# Patient Record
Sex: FEMALE | Race: WHITE | NOT HISPANIC OR LATINO | Employment: OTHER | URBAN - METROPOLITAN AREA
[De-identification: names, ages, dates, MRNs, and addresses within clinical notes are randomized per-mention and may not be internally consistent; named-entity substitution may affect disease eponyms.]

---

## 2022-05-18 DIAGNOSIS — Z85.3 HX OF BREAST CANCER: Primary | ICD-10-CM

## 2022-05-24 DIAGNOSIS — Z85.3 HX OF BREAST CANCER: Primary | ICD-10-CM

## 2022-06-24 ENCOUNTER — LAB VISIT (OUTPATIENT)
Dept: LAB | Facility: HOSPITAL | Age: 67
End: 2022-06-24
Attending: STUDENT IN AN ORGANIZED HEALTH CARE EDUCATION/TRAINING PROGRAM
Payer: COMMERCIAL

## 2022-06-24 ENCOUNTER — OFFICE VISIT (OUTPATIENT)
Dept: RHEUMATOLOGY | Facility: CLINIC | Age: 67
End: 2022-06-24
Payer: COMMERCIAL

## 2022-06-24 VITALS
SYSTOLIC BLOOD PRESSURE: 120 MMHG | OXYGEN SATURATION: 91 % | TEMPERATURE: 99 F | HEART RATE: 78 BPM | BODY MASS INDEX: 25.88 KG/M2 | HEIGHT: 60 IN | DIASTOLIC BLOOD PRESSURE: 78 MMHG | RESPIRATION RATE: 18 BRPM | WEIGHT: 131.81 LBS

## 2022-06-24 DIAGNOSIS — F51.01 PRIMARY INSOMNIA: ICD-10-CM

## 2022-06-24 DIAGNOSIS — M05.9 SEROPOSITIVE RHEUMATOID ARTHRITIS: ICD-10-CM

## 2022-06-24 DIAGNOSIS — M79.7 FIBROMYALGIA SYNDROME: ICD-10-CM

## 2022-06-24 DIAGNOSIS — M05.9 SEROPOSITIVE RHEUMATOID ARTHRITIS: Primary | ICD-10-CM

## 2022-06-24 LAB
ALBUMIN SERPL-MCNC: 4.2 GM/DL (ref 3.4–4.8)
ALBUMIN/GLOB SERPL: 1.5 RATIO (ref 1.1–2)
ALP SERPL-CCNC: 87 UNIT/L (ref 40–150)
ALT SERPL-CCNC: 18 UNIT/L (ref 0–55)
AST SERPL-CCNC: 19 UNIT/L (ref 5–34)
BASOPHILS # BLD AUTO: 0.04 X10(3)/MCL (ref 0–0.2)
BASOPHILS NFR BLD AUTO: 0.4 %
BILIRUBIN DIRECT+TOT PNL SERPL-MCNC: 0.8 MG/DL
BUN SERPL-MCNC: 22.5 MG/DL (ref 9.8–20.1)
CALCIUM SERPL-MCNC: 9.8 MG/DL (ref 8.4–10.2)
CHLORIDE SERPL-SCNC: 105 MMOL/L (ref 98–107)
CO2 SERPL-SCNC: 25 MMOL/L (ref 23–31)
CREAT SERPL-MCNC: 0.61 MG/DL (ref 0.55–1.02)
CRP SERPL-MCNC: 14.3 MG/L
EOSINOPHIL # BLD AUTO: 0.19 X10(3)/MCL (ref 0–0.9)
EOSINOPHIL NFR BLD AUTO: 2.1 %
ERYTHROCYTE [DISTWIDTH] IN BLOOD BY AUTOMATED COUNT: 13.5 % (ref 11.5–17)
GLOBULIN SER-MCNC: 2.8 GM/DL (ref 2.4–3.5)
GLUCOSE SERPL-MCNC: 103 MG/DL (ref 82–115)
HCT VFR BLD AUTO: 44.3 % (ref 37–47)
HGB BLD-MCNC: 14.6 GM/DL (ref 12–16)
IMM GRANULOCYTES # BLD AUTO: 0.09 X10(3)/MCL (ref 0–0.02)
IMM GRANULOCYTES NFR BLD AUTO: 1 % (ref 0–0.43)
LYMPHOCYTES # BLD AUTO: 1.05 X10(3)/MCL (ref 0.6–4.6)
LYMPHOCYTES NFR BLD AUTO: 11.6 %
MCH RBC QN AUTO: 32.5 PG (ref 27–31)
MCHC RBC AUTO-ENTMCNC: 33 MG/DL (ref 33–36)
MCV RBC AUTO: 98.7 FL (ref 80–94)
MONOCYTES # BLD AUTO: 0.61 X10(3)/MCL (ref 0.1–1.3)
MONOCYTES NFR BLD AUTO: 6.7 %
NEUTROPHILS # BLD AUTO: 7.1 X10(3)/MCL (ref 2.1–9.2)
NEUTROPHILS NFR BLD AUTO: 78.2 %
NRBC BLD AUTO-RTO: 0 %
PLATELET # BLD AUTO: 270 X10(3)/MCL (ref 130–400)
PMV BLD AUTO: 10.5 FL (ref 9.4–12.4)
POTASSIUM SERPL-SCNC: 4.1 MMOL/L (ref 3.5–5.1)
PROT SERPL-MCNC: 7 GM/DL (ref 5.8–7.6)
RBC # BLD AUTO: 4.49 X10(6)/MCL (ref 4.2–5.4)
RHEUMATOID FACT SERPL-ACNC: <13 IU/ML
SODIUM SERPL-SCNC: 138 MMOL/L (ref 136–145)
WBC # SPEC AUTO: 9.1 X10(3)/MCL (ref 4.5–11.5)

## 2022-06-24 PROCEDURE — 85025 COMPLETE CBC W/AUTO DIFF WBC: CPT

## 2022-06-24 PROCEDURE — 86039 ANTINUCLEAR ANTIBODIES (ANA): CPT | Performed by: INTERNAL MEDICINE

## 2022-06-24 PROCEDURE — 1160F RVW MEDS BY RX/DR IN RCRD: CPT | Mod: CPTII,S$GLB,, | Performed by: INTERNAL MEDICINE

## 2022-06-24 PROCEDURE — 99999 PR PBB SHADOW E&M-EST. PATIENT-LVL IV: ICD-10-PCS | Mod: PBBFAC,,, | Performed by: INTERNAL MEDICINE

## 2022-06-24 PROCEDURE — 1159F MED LIST DOCD IN RCRD: CPT | Mod: CPTII,S$GLB,, | Performed by: INTERNAL MEDICINE

## 2022-06-24 PROCEDURE — 99999 PR PBB SHADOW E&M-EST. PATIENT-LVL IV: CPT | Mod: PBBFAC,,, | Performed by: INTERNAL MEDICINE

## 2022-06-24 PROCEDURE — 86431 RHEUMATOID FACTOR QUANT: CPT

## 2022-06-24 PROCEDURE — 80053 COMPREHEN METABOLIC PANEL: CPT

## 2022-06-24 PROCEDURE — 99204 OFFICE O/P NEW MOD 45 MIN: CPT | Mod: S$GLB,,, | Performed by: INTERNAL MEDICINE

## 2022-06-24 PROCEDURE — 36415 COLL VENOUS BLD VENIPUNCTURE: CPT

## 2022-06-24 PROCEDURE — 86803 HEPATITIS C AB TEST: CPT

## 2022-06-24 PROCEDURE — 87340 HEPATITIS B SURFACE AG IA: CPT

## 2022-06-24 PROCEDURE — 99204 PR OFFICE/OUTPT VISIT, NEW, LEVL IV, 45-59 MIN: ICD-10-PCS | Mod: S$GLB,,, | Performed by: INTERNAL MEDICINE

## 2022-06-24 PROCEDURE — 86480 TB TEST CELL IMMUN MEASURE: CPT | Performed by: INTERNAL MEDICINE

## 2022-06-24 PROCEDURE — 3008F PR BODY MASS INDEX (BMI) DOCUMENTED: ICD-10-PCS | Mod: CPTII,S$GLB,, | Performed by: INTERNAL MEDICINE

## 2022-06-24 PROCEDURE — 1159F PR MEDICATION LIST DOCUMENTED IN MEDICAL RECORD: ICD-10-PCS | Mod: CPTII,S$GLB,, | Performed by: INTERNAL MEDICINE

## 2022-06-24 PROCEDURE — 86140 C-REACTIVE PROTEIN: CPT

## 2022-06-24 PROCEDURE — 86200 CCP ANTIBODY: CPT

## 2022-06-24 PROCEDURE — 1160F PR REVIEW ALL MEDS BY PRESCRIBER/CLIN PHARMACIST DOCUMENTED: ICD-10-PCS | Mod: CPTII,S$GLB,, | Performed by: INTERNAL MEDICINE

## 2022-06-24 PROCEDURE — 3008F BODY MASS INDEX DOCD: CPT | Mod: CPTII,S$GLB,, | Performed by: INTERNAL MEDICINE

## 2022-06-24 RX ORDER — TRAMADOL HYDROCHLORIDE 50 MG/1
580 TABLET ORAL
COMMUNITY
End: 2023-10-23 | Stop reason: SDUPTHER

## 2022-06-24 RX ORDER — MELOXICAM 7.5 MG/1
7.5 TABLET ORAL DAILY
COMMUNITY
End: 2022-06-24 | Stop reason: SDUPTHER

## 2022-06-24 RX ORDER — TIZANIDINE 4 MG/1
4 TABLET ORAL NIGHTLY
Qty: 90 TABLET | Refills: 3 | Status: SHIPPED | OUTPATIENT
Start: 2022-06-24 | End: 2022-10-18 | Stop reason: SDUPTHER

## 2022-06-24 RX ORDER — METHOTREXATE 2.5 MG/1
2.5 TABLET ORAL
COMMUNITY
Start: 2022-05-07 | End: 2022-06-24

## 2022-06-24 RX ORDER — LEFLUNOMIDE 20 MG/1
20 TABLET ORAL
Qty: 90 TABLET | Refills: 3 | Status: SHIPPED | OUTPATIENT
Start: 2022-06-24 | End: 2022-07-05

## 2022-06-24 RX ORDER — DICLOFENAC SODIUM 30 MG/G
GEL TOPICAL
COMMUNITY
Start: 2022-06-15 | End: 2023-06-19

## 2022-06-24 RX ORDER — DEXTROMETHORPHAN HYDROBROMIDE, GUAIFENESIN 5; 100 MG/5ML; MG/5ML
650 LIQUID ORAL DAILY
COMMUNITY

## 2022-06-24 RX ORDER — LEVOTHYROXINE SODIUM 100 UG/1
100 TABLET ORAL DAILY
COMMUNITY
Start: 2022-05-13 | End: 2023-06-19

## 2022-06-24 RX ORDER — ATORVASTATIN CALCIUM 40 MG/1
40 TABLET, FILM COATED ORAL DAILY
COMMUNITY
Start: 2022-05-13

## 2022-06-24 RX ORDER — CYCLOBENZAPRINE HCL 10 MG
10 TABLET ORAL
COMMUNITY
End: 2022-06-24

## 2022-06-24 RX ORDER — MELOXICAM 7.5 MG/1
7.5 TABLET ORAL DAILY
Qty: 90 TABLET | Refills: 3 | Status: SHIPPED | OUTPATIENT
Start: 2022-06-24 | End: 2022-10-18 | Stop reason: SDUPTHER

## 2022-06-24 RX ORDER — LIOTHYRONINE SODIUM 5 UG/1
5 TABLET ORAL DAILY
COMMUNITY
Start: 2022-06-21

## 2022-06-24 RX ORDER — OMEPRAZOLE 40 MG/1
40 CAPSULE, DELAYED RELEASE ORAL EVERY MORNING
Qty: 90 CAPSULE | Refills: 3 | Status: SHIPPED | OUTPATIENT
Start: 2022-06-24 | End: 2022-10-18 | Stop reason: SDUPTHER

## 2022-06-24 RX ORDER — FOLIC ACID 1 MG/1
1 TABLET ORAL DAILY
COMMUNITY
End: 2022-06-24

## 2022-06-24 NOTE — PROGRESS NOTES
Subjective:       Patient ID: Hemant Eid is a 66 y.o. female.    Chief Complaint: New Patient  (Joint pain )    Pt  is complaining of joint pain involving his MCP PIP wrist elbow shoulders hips knees and ankles bilaterally.  Is 10/10 in intensity dull in quality and continuous.  It is associated with a morning stiffness lasting for more than 60 minutes. Pt reports having difficulty maintaining a good night of sleep and this has been associated with myalgia of 10/10 in intensity.  This pain is dull continuous and gets worse mainly at night.  It is associated with fatigue.  No fever no chills no others.      Review of Systems   Constitutional: Negative for appetite change, chills and fever.   HENT: Negative for congestion, ear pain, mouth sores, nosebleeds and trouble swallowing.    Eyes: Negative for photophobia and discharge.   Respiratory: Negative for chest tightness and shortness of breath.    Cardiovascular: Negative for chest pain.   Gastrointestinal: Negative for abdominal pain and vomiting.   Endocrine: Negative.    Genitourinary: Negative for hematuria.   Musculoskeletal:        As per HPI   Skin: Negative for rash.   Neurological: Negative for weakness.         Objective:   /78 (BP Location: Right arm, Patient Position: Sitting, BP Method: Medium (Automatic))   Pulse 78   Temp 99.1 °F (37.3 °C) (Oral)   Resp 18   Ht 5' (1.524 m)   Wt 59.8 kg (131 lb 12.8 oz)   SpO2 (!) 91%   BMI 25.74 kg/m²      Physical Exam   Constitutional: She is oriented to person, place, and time. She appears well-developed and well-nourished. No distress.   HENT:   Head: Normocephalic and atraumatic.   Right Ear: External ear normal.   Left Ear: External ear normal.   Eyes: Pupils are equal, round, and reactive to light.   Cardiovascular: Normal rate, regular rhythm and normal heart sounds.   Pulmonary/Chest: Breath sounds normal.   Abdominal: Soft. There is no abdominal tenderness.   Musculoskeletal:      Right  shoulder: Tenderness present.      Left shoulder: Tenderness present.      Right elbow: Tenderness present.      Left elbow: Tenderness present.      Right wrist: Tenderness present.      Left wrist: Tenderness present.      Cervical back: Neck supple.      Right hip: Tenderness present.      Left hip: Tenderness present.      Right knee: Tenderness present.      Left knee: Tenderness present.      Right ankle: Tenderness present.      Left ankle: Tenderness present.   Lymphadenopathy:     She has no cervical adenopathy.   Neurological: She is alert and oriented to person, place, and time. She displays normal reflexes. No cranial nerve deficit or sensory deficit. She exhibits normal muscle tone. Coordination normal.   Skin: No rash noted. No erythema.   Vitals reviewed.      Right Side Rheumatological Exam     The patient is tender to palpation of the shoulder, elbow, wrist, knee, 1st PIP, 1st MCP, 2nd PIP, 2nd MCP, 3rd PIP, 3rd MCP, 4th PIP, 4th MCP, 5th PIP, hip, ankle, 1st MTP, 2nd MTP, 3rd MTP, 4th MTP, 5th MTP, 1st toe IP, 2nd toe IP, 3rd toe IP, 4th toe IP and 5th toe IP    Left Side Rheumatological Exam     The patient is tender to palpation of the shoulder, elbow, wrist, knee, 1st PIP, 1st MCP, 2nd PIP, 2nd MCP, 3rd PIP, 3rd MCP, 4th PIP, 4th MCP, 5th PIP, 5th MCP, hip, ankle, 1st MTP, 2nd MTP, 3rd MTP, 4th MTP, 5th MTP, 1st toe IP, 2nd toe IP, 3rd toe IP, 4th toe IP and 5th toe IP.         Completed Fibromyalgia exam 18/18 tender points.  No data to display     Assessment:       1. Seropositive rheumatoid arthritis    2. Primary insomnia    3. Fibromyalgia syndrome            Plan:       Problem List Items Addressed This Visit    None     Visit Diagnoses     Seropositive rheumatoid arthritis    -  Primary    Relevant Medications    meloxicam (MOBIC) 7.5 MG tablet    omeprazole (PRILOSEC) 40 MG capsule    leflunomide (ARAVA) 20 MG Tab    tiZANidine (ZANAFLEX) 4 MG tablet    Other Relevant Orders     Quantiferon Gold TB    CBC Auto Differential    Comprehensive Metabolic Panel    C-Reactive Protein    Cyclic Citrullinated Peptide Antibody, IgG    Antinuclear Ab, HEp-2 Substrate    Rheumatoid Quantitative    Hepatitis B surface antigen    Hepatitis C antibody    Primary insomnia        Relevant Medications    meloxicam (MOBIC) 7.5 MG tablet    omeprazole (PRILOSEC) 40 MG capsule    leflunomide (ARAVA) 20 MG Tab    tiZANidine (ZANAFLEX) 4 MG tablet    Other Relevant Orders    Quantiferon Gold TB    CBC Auto Differential    Comprehensive Metabolic Panel    C-Reactive Protein    Cyclic Citrullinated Peptide Antibody, IgG    Antinuclear Ab, HEp-2 Substrate    Rheumatoid Quantitative    Hepatitis B surface antigen    Hepatitis C antibody    Fibromyalgia syndrome        Relevant Medications    meloxicam (MOBIC) 7.5 MG tablet    omeprazole (PRILOSEC) 40 MG capsule    leflunomide (ARAVA) 20 MG Tab    tiZANidine (ZANAFLEX) 4 MG tablet    Other Relevant Orders    Quantiferon Gold TB    CBC Auto Differential    Comprehensive Metabolic Panel    C-Reactive Protein    Cyclic Citrullinated Peptide Antibody, IgG    Antinuclear Ab, HEp-2 Substrate    Rheumatoid Quantitative    Hepatitis B surface antigen    Hepatitis C antibody

## 2022-06-25 LAB
CCP IGG SERPL-ACNC: 2 UNITS
HBV SURFACE AG SERPL QL IA: NONREACTIVE
HCV AB SERPL QL IA: NONREACTIVE

## 2022-06-27 LAB — ANA SER QL HEP2 SUBST: NORMAL

## 2022-06-28 LAB
GAMMA INTERFERON BACKGROUND BLD IA-ACNC: 0 IU/ML
M TB IFN-G BLD-IMP: NEGATIVE
M TB IFN-G CD4+ BCKGRND COR BLD-ACNC: 0 IU/ML
M TB IFN-G CD4+CD8+ BCKGRND COR BLD-ACNC: 0 IU/ML
MITOGEN IGNF BCKGRD COR BLD-ACNC: 10 IU/ML

## 2022-06-28 NOTE — PROGRESS NOTES
[facility] - Breast [place] [facility]  Breast Surgical Oncology  New Patient Office Visit - H&P      Referring Provider: Larissa Carpenter  PCP: Adam Carpenter, RN   Care Team:  Prior Breast Surgeon: Dr. Katherine Rivera ([phone])  Prior Medical Oncologist: Dr. Urrutia [facility]  Radiation Oncologist: No care team member to display       Chief Complaint:   Chief Complaint   Patient presents with    Breast Cancer     New Patient, h/o right breast cancer, last mammogram 10/4/21        Subjective:     HPI:  Hemant Eid is a 66 y.o. female with a PMH of right breast cancer diagnosed at age 61 s/p chemotherapy, mastectomy, and radiation. Since her treatments, she has followed with an Oncologist and a Breast Surgeon in [place] for breast cancer surveillance and is looking to establish care locally since moving to [place], [place].     She was originally diagnosed at age 60 after abnormal findings were seen on her screening mammogram. Core needle biopsy 6/22/2016 of a mass in the 11:30 position confirmed breast cancer (Stage IIB invasive ductal carcinoma cT2, cN1, cM0 ER/ND negative, HER2 positive). Further evaluation with MRI revealed multifocal disease (1.1 cm mass and 2.3 cm nonmass focus) and a 1.6 cm lymph node consistent with metastatic disease. She underwent neoadjuvant chemotherapy (PTCH x's 6 cycles). She then had a right simple mastectomy and SLNB which revealed no residual invasive carcinoma (ypT0, N0). Patient reports that she had adjuvant radiation. She was able to have CIERRA flap reconstruction of the right breast performed in [place], [place], which was complicated by an acute bleed/hematoma causing drastic drop in H/H. She is a Yazidi and was unable to receive blood products but eventually recovered with erythropoietin.  She has had no breast issues since other than pain in the right breast which is still bothersome to her today.     She has had many evaluations for other  unrelated problems which all found no evidence of disease (including back MRI in , PET/CT in 2019, CT abdomen , US Head/Neck 2018, US left back and arm 2018, US R UE 2018,).    Imagin. Prior imaging reports performed at [facility] in [facility] were reviewed. Will need to request discs of imaging to aid in continued surveillance.    OB/GYN History:  Age at Menarche Onset: 15  Menopausal Status: postmenopausal, LMP: No LMP recorded. Patient is postmenopausal.  Hysterectomy/Oophorectomy: menopause, at age 56  Hormonal birth control (duration): none  Pregnancy History:   Age at first live birth: 29  Hormone Replacement Therapy: No, none    Other:  MG breast density: Category B  Prior thoracic RT: none  Genetic testing: none  Ashkenazi Christianity descent: No    Family History:  History reviewed. No pertinent family history.     Patient History:  Past Medical History:   Diagnosis Date    Cancer Breast cancer    Dyspareunia After menopause    Menopause About 15 years ago    Menstrual symptom or sign 15 years old    Pregnancy 3    Thyroid disease Hashimoto       Body mass index is 25.86 kg/m².     Past Surgical History:   Procedure Laterality Date    BREAST SURGERY  2016     SECTION      MASTECTOMY Right     TONSILLECTOMY      TUBAL LIGATION  97       Social History     Socioeconomic History    Marital status:    Tobacco Use    Smoking status: Never Smoker    Smokeless tobacco: Never Used   Substance and Sexual Activity    Alcohol use: Yes     Comment: 1 or 2 drinks a week    Drug use: Never    Sexual activity: Yes     Partners: Male     Birth control/protection: None         There is no immunization history on file for this patient.    Medications/Allergies:    Current Outpatient Medications:     acetaminophen (TYLENOL) 650 MG TbSR, Take 650 mg by mouth once daily., Disp: , Rfl:     atorvastatin (LIPITOR) 40 MG tablet, Take 40 mg by mouth once  daily. for 90 days, Disp: , Rfl:     diclofenac sodium (SOLARAZE) 3 % gel, APPLY 1 GRAM 3-4 TIMES DAILY FOR TREATMENT OF PAIN, Disp: , Rfl:     leflunomide (ARAVA) 20 MG Tab, Take 1 tablet (20 mg total) by mouth daily with dinner or evening meal., Disp: 90 tablet, Rfl: 3    liothyronine (CYTOMEL) 5 MCG Tab, Take 5 mcg by mouth once daily., Disp: , Rfl:     meloxicam (MOBIC) 7.5 MG tablet, Take 1 tablet (7.5 mg total) by mouth once daily., Disp: 90 tablet, Rfl: 3    omeprazole (PRILOSEC) 40 MG capsule, Take 1 capsule (40 mg total) by mouth every morning., Disp: 90 capsule, Rfl: 3    SYNTHROID 100 mcg tablet, Take 100 mcg by mouth once daily. for 90 days, Disp: , Rfl:     tiZANidine (ZANAFLEX) 4 MG tablet, Take 1 tablet (4 mg total) by mouth nightly., Disp: 90 tablet, Rfl: 3    traMADoL (ULTRAM) 50 mg tablet, Take 580 mg by mouth as needed., Disp: , Rfl:      Review of patient's allergies indicates:   Allergen Reactions    Sulfa (sulfonamide antibiotics) Hives, Itching and Rash     Other reaction(s): Facial Swelling       Review of Systems:  Pertinent items are noted in HPI.     Objective:     Vitals:  Vitals:    06/29/22 0933   BP: 127/77   Pulse: 76   Resp: 18   Temp: 97.7 °F (36.5 °C)       Physical Exam:  General: The patient is awake, alert and oriented times three. The patient is well nourished and in no acute distress.  Neck: There is no evidence of palpable cervical, supraclavicular or axillary adenopathy. The neck is supple. The thyroid is not enlarged.  Musculoskeletal: The patient has a normal range of motion of her bilateral upper extremities.  Chest: Examination of the chest wall fails to reveal any obvious abnormalities. Nonlabored breathing, symmetric expansion.  Breast:  Right: Examination of right reconstructed breast fails to reveal any dominant masses or areas of significant focal nodularity. There is tenderness to palpation of the axilla and axillary tail. The nipple is surgically absent.  There is no skin dimpling with movement of the pectoralis. There are no significant skin changes overlying the breast.   Left: Examination of the left breast fails to reveal any dominant masses or areas of significant focal nodularity. The nipple is everted without evidence of discharge. There is no skin dimpling with movement of the pectoralis. There are no significant skin changes overlying the breast.  Abdomen: The abdomen is soft, flat, nontender and nondistended.  Integumentary: no rashes or skin lesions present  Neurologic: cranial nerves intact, no signs of peripheral neurological deficit, motor/sensory function intact      Assessment and Plan:            Hemant was seen today for breast cancer.    Diagnoses and all orders for this visit:    Personal history of breast cancer  -     Ambulatory referral/consult to Breast Surgery    History of right mastectomy    Breast pain, right    Screening mammogram, encounter for          Plan:       Referral to medical oncology (Dr. Blanchard) for continued breast cancer surveillance and history of bleeding (possible Von Willebrand per prior oncology notes?).    US right breast limited to evaluate breast/axilla pain. DG MG if needed.    L SCR MG due October 2022.    RTC in 1 year for breast cancer surveillance.    Prior imaging reports performed at [facility] in [facility] were reviewed. Will need to request discs of imaging to aid in continued surveillance.          All of her questions were answered.     Katie Burgos PA-C            --------------------------------------------------------------------------------------------------------------  Total time on the date of the visit ranged from 60-74 mins (62239). Total time includes both face-to-face and non-face-to-face time personally spent by myself on the day of the visit.    Non-face-to-face time included:  _X_ preparing to see the patient such as reviewing the patient record  _X_ obtaining and  reviewing separately obtained history  _X_ independently interpreting results  _X_ documenting clinical information in electronic health record.    Face-to-face time included:  _X_ performing an appropriate history and examination  _X_ communicating results to the patient  _X_ counseling and educating the patient  __ ordering appropriate medications  _x_ ordering appropriate tests  _X_ ordering appropriate procedures (including follow-up)  _X_ answering any questions the patient had    Total Time spent on date of visit: 60 minutes

## 2022-06-29 ENCOUNTER — OFFICE VISIT (OUTPATIENT)
Dept: SURGERY | Facility: CLINIC | Age: 67
End: 2022-06-29
Payer: COMMERCIAL

## 2022-06-29 VITALS
RESPIRATION RATE: 18 BRPM | SYSTOLIC BLOOD PRESSURE: 127 MMHG | DIASTOLIC BLOOD PRESSURE: 77 MMHG | BODY MASS INDEX: 25.99 KG/M2 | HEIGHT: 60 IN | WEIGHT: 132.38 LBS | HEART RATE: 76 BPM | OXYGEN SATURATION: 100 % | TEMPERATURE: 98 F

## 2022-06-29 DIAGNOSIS — Z90.11 HISTORY OF RIGHT MASTECTOMY: ICD-10-CM

## 2022-06-29 DIAGNOSIS — N64.4 BREAST PAIN, RIGHT: ICD-10-CM

## 2022-06-29 DIAGNOSIS — Z85.3 PERSONAL HISTORY OF BREAST CANCER: Primary | ICD-10-CM

## 2022-06-29 DIAGNOSIS — Z12.31 SCREENING MAMMOGRAM, ENCOUNTER FOR: ICD-10-CM

## 2022-06-29 PROCEDURE — 3008F PR BODY MASS INDEX (BMI) DOCUMENTED: ICD-10-PCS | Mod: CPTII,S$GLB,, | Performed by: PHYSICIAN ASSISTANT

## 2022-06-29 PROCEDURE — 99205 PR OFFICE/OUTPT VISIT, NEW, LEVL V, 60-74 MIN: ICD-10-PCS | Mod: S$GLB,,, | Performed by: PHYSICIAN ASSISTANT

## 2022-06-29 PROCEDURE — 3008F BODY MASS INDEX DOCD: CPT | Mod: CPTII,S$GLB,, | Performed by: PHYSICIAN ASSISTANT

## 2022-06-29 PROCEDURE — 1160F RVW MEDS BY RX/DR IN RCRD: CPT | Mod: CPTII,S$GLB,, | Performed by: PHYSICIAN ASSISTANT

## 2022-06-29 PROCEDURE — 99205 OFFICE O/P NEW HI 60 MIN: CPT | Mod: S$GLB,,, | Performed by: PHYSICIAN ASSISTANT

## 2022-06-29 PROCEDURE — 99999 PR PBB SHADOW E&M-EST. PATIENT-LVL V: ICD-10-PCS | Mod: PBBFAC,,,

## 2022-06-29 PROCEDURE — 3078F PR MOST RECENT DIASTOLIC BLOOD PRESSURE < 80 MM HG: ICD-10-PCS | Mod: CPTII,S$GLB,, | Performed by: PHYSICIAN ASSISTANT

## 2022-06-29 PROCEDURE — 1160F PR REVIEW ALL MEDS BY PRESCRIBER/CLIN PHARMACIST DOCUMENTED: ICD-10-PCS | Mod: CPTII,S$GLB,, | Performed by: PHYSICIAN ASSISTANT

## 2022-06-29 PROCEDURE — 3074F PR MOST RECENT SYSTOLIC BLOOD PRESSURE < 130 MM HG: ICD-10-PCS | Mod: CPTII,S$GLB,, | Performed by: PHYSICIAN ASSISTANT

## 2022-06-29 PROCEDURE — 3078F DIAST BP <80 MM HG: CPT | Mod: CPTII,S$GLB,, | Performed by: PHYSICIAN ASSISTANT

## 2022-06-29 PROCEDURE — 1159F MED LIST DOCD IN RCRD: CPT | Mod: CPTII,S$GLB,, | Performed by: PHYSICIAN ASSISTANT

## 2022-06-29 PROCEDURE — 3074F SYST BP LT 130 MM HG: CPT | Mod: CPTII,S$GLB,, | Performed by: PHYSICIAN ASSISTANT

## 2022-06-29 PROCEDURE — 99999 PR PBB SHADOW E&M-EST. PATIENT-LVL V: CPT | Mod: PBBFAC,,,

## 2022-06-29 PROCEDURE — 1159F PR MEDICATION LIST DOCUMENTED IN MEDICAL RECORD: ICD-10-PCS | Mod: CPTII,S$GLB,, | Performed by: PHYSICIAN ASSISTANT

## 2022-07-05 ENCOUNTER — TELEPHONE (OUTPATIENT)
Dept: RHEUMATOLOGY | Facility: CLINIC | Age: 67
End: 2022-07-05
Payer: COMMERCIAL

## 2022-07-05 DIAGNOSIS — F40.298 NEEDLE PHOBIA: ICD-10-CM

## 2022-07-05 DIAGNOSIS — M05.9 SEROPOSITIVE RHEUMATOID ARTHRITIS: Primary | ICD-10-CM

## 2022-07-05 RX ORDER — FOLIC ACID 1 MG/1
1 TABLET ORAL DAILY
Qty: 30 TABLET | Refills: 5 | Status: SHIPPED | OUTPATIENT
Start: 2022-07-05 | End: 2022-07-27

## 2022-07-05 RX ORDER — METHOTREXATE 2.5 MG/1
10 TABLET ORAL
Qty: 20 TABLET | Refills: 5 | Status: SHIPPED | OUTPATIENT
Start: 2022-07-05 | End: 2022-07-27

## 2022-07-05 NOTE — TELEPHONE ENCOUNTER
Patient called stating that the leflunomide (Arava) Rx is causing her to get headaches when getting up in the morning.  She spoke to her PCP about it and stated that is a side affect.  She would like to know if there is anything else that can be prescribed?  Please advise.

## 2022-07-06 NOTE — TELEPHONE ENCOUNTER
Spoke with patient and she stated that she has been on methotrexate for years and feels like it doesn't help and that's why she was prescribed this new medication. The patient is also wanting you to look at her c reactive protein and how high it was before seeing you as a new patient. Is there anything that you would like to prescribe for this patient besides the methotrexate? Please Advise.. Thanks

## 2022-07-07 NOTE — TELEPHONE ENCOUNTER
Spoke with pt she stated that she has tried Plaquenil as well and would like to maybe try a newer drug. I did explain that some medications require trying lower tier drugs before being approved. Pt verbalized understanding. Is there anything newer you can write for her? Please advise. Thanks

## 2022-07-08 ENCOUNTER — PATIENT MESSAGE (OUTPATIENT)
Dept: RHEUMATOLOGY | Facility: CLINIC | Age: 67
End: 2022-07-08
Payer: COMMERCIAL

## 2022-07-11 ENCOUNTER — TELEPHONE (OUTPATIENT)
Dept: RHEUMATOLOGY | Facility: CLINIC | Age: 67
End: 2022-07-11
Payer: COMMERCIAL

## 2022-07-11 NOTE — TELEPHONE ENCOUNTER
Spoke with the patient she would like to try the Rinvoq before trying the injection.. I did speak with Toshia and  She said that she can work on the PA for this patient as soon as you send the script in . Please Advise.. Thanks

## 2022-07-12 RX ORDER — UPADACITINIB 15 MG/1
15 TABLET, EXTENDED RELEASE ORAL DAILY
Qty: 30 TABLET | Refills: 11 | Status: SHIPPED | OUTPATIENT
Start: 2022-07-12 | End: 2022-08-02 | Stop reason: SDUPTHER

## 2022-07-15 NOTE — TELEPHONE ENCOUNTER
Script has been faxed to [facility] specialty pharmacy       ----- Message from Meredith Washington sent at 7/15/2022  9:49 AM CDT -----  Regarding: Medication refill  [facility] called requesting Script for Rinvoq... Fax: [phone]   Phone: [phone]

## 2022-07-26 ENCOUNTER — DOCUMENTATION ONLY (OUTPATIENT)
Dept: HEMATOLOGY/ONCOLOGY | Facility: CLINIC | Age: 67
End: 2022-07-26
Payer: COMMERCIAL

## 2022-07-26 NOTE — PROGRESS NOTES
Subjective:       Patient ID: Hemant Eid is a 66 y.o. female.    Chief Complaint: Breast Cancer (New Oncology patient-- Has concerns about her RA medication)      Diagnosis:  1.  cT2, N1, M0 stage IIB ER/ND negative, HER2 positive multifocal invasive ductal carcinoma of the right breast diagnosed in 2016. Complete pathologic response to neoadjuvant therapy.    Current Treatment:   1.  Observation    Treatment History:  1. Neoadjuvant TCHP with Neulasta for 6 cycles.  2. Right mastectomy  3. Radiation therapy  4. Maintenance Herceptin for 13 additional cycles to complete 1 year     HPI  Patient who underwent routine imaging in [place] and was found to have an abnormality in the right breast, underwent a right core needle biopsy on 06/22/2016 revealed invasive ductal carcinoma, grade 2 with right axilla having lymph nodes positive for metastatic carcinoma.  ER 0%, ND 0%, HER2 3+ by IHC with a Ki-67 of 50%.  She then had a right breast MRI on 07/06/2016 that revealed right breast mass along with right axillary lymph node.  PET/CT scan on 07/08/2016 showed right lateral breast nodularity consistent with known cancer and mildly enlarged right axillary lymph node consistent with metastatic disease.  No distant metastatic disease present.  She then underwent a biopsy of a separate right breast lesion on 07/20/2016 that revealed grade 2-3 invasive ductal carcinoma, ER/ND negative, HER2 positive with a Ki-67 of 35%.  She was treated with neoadjuvant TCHP for 6 cycles, started on 07/26/2016.  She underwent a right mastectomy on 12/08/2016 that revealed complete pathologic response.  She then completed chest wall and axillary radiation.  She also had maintenance Herceptin that he finished in July of 2017. She underwent CIERRA flap reconstruction in [place], [place] on 12/07/2017.  The patient was undergoing surveillance, most recently had a mammogram in October 2021 showing no evidence of malignancy.  She relocated in  moved to [facility] and established care with Dr. Lima simpson in 2022. She wanted a medical oncologist, saw me on 2022.  She had no major complaints to discuss at that visit.     Interval History:   Initial consult.    Past Medical History:   Diagnosis Date    Cancer Breast cancer    Dyspareunia After menopause    Menopause About 15 years ago    Menstrual symptom or sign 15 years old    Pregnancy 3    Thyroid disease Hashimoto      Past Surgical History:   Procedure Laterality Date    BREAST SURGERY  2016     SECTION      MASTECTOMY Right     TONSILLECTOMY      TUBAL LIGATION  97     Social History     Socioeconomic History    Marital status:    Tobacco Use    Smoking status: Never Smoker    Smokeless tobacco: Never Used   Substance and Sexual Activity    Alcohol use: Yes     Comment: 1 or 2 drinks a week    Drug use: Never    Sexual activity: Yes     Partners: Male     Birth control/protection: Post-menopausal, None      Family History   Problem Relation Age of Onset    COPD Mother     Heart disease Maternal Grandmother     Arthritis Daughter     Learning disabilities Son       Review of patient's allergies indicates:   Allergen Reactions    Sulfa (sulfonamide antibiotics) Hives, Itching and Rash     Other reaction(s): Facial Swelling      Review of Systems   Constitutional: Negative for appetite change and unexpected weight change.   HENT: Negative for mouth sores.    Eyes: Negative for visual disturbance.   Respiratory: Negative for cough and shortness of breath.    Cardiovascular: Negative for chest pain.   Gastrointestinal: Negative for abdominal pain and diarrhea.   Genitourinary: Negative for frequency.   Musculoskeletal: Negative for back pain.   Integumentary:  Negative for rash.   Neurological: Negative for headaches.   Hematological: Negative for adenopathy.   Psychiatric/Behavioral: The patient is not nervous/anxious.          Objective:       Physical Exam  Vitals reviewed. Exam conducted with a chaperone present.   Constitutional:       General: She is awake.      Appearance: Normal appearance. She is well-developed.   HENT:      Head: Normocephalic and atraumatic.   Eyes:      General: Lids are normal. Vision grossly intact.      Extraocular Movements: Extraocular movements intact.      Conjunctiva/sclera: Conjunctivae normal.   Cardiovascular:      Rate and Rhythm: Normal rate and regular rhythm.      Pulses: Normal pulses.      Heart sounds: Normal heart sounds.   Pulmonary:      Effort: Pulmonary effort is normal.      Breath sounds: Normal breath sounds.   Chest:   Breasts:      Right: Normal. No axillary adenopathy or supraclavicular adenopathy.      Left: Normal. No axillary adenopathy or supraclavicular adenopathy.       Abdominal:      General: Bowel sounds are normal. There is no distension.      Palpations: Abdomen is soft.      Tenderness: There is no abdominal tenderness.   Musculoskeletal:      Cervical back: Full passive range of motion without pain.      Right lower leg: No edema.      Left lower leg: No edema.   Lymphadenopathy:      Cervical: No cervical adenopathy.      Upper Body:      Right upper body: No supraclavicular, axillary or pectoral adenopathy.      Left upper body: No supraclavicular, axillary or pectoral adenopathy.   Skin:     General: Skin is warm and dry.   Neurological:      General: No focal deficit present.      Mental Status: She is alert and oriented to person, place, and time.   Psychiatric:         Attention and Perception: Attention and perception normal.         Behavior: Behavior is cooperative.         LABS AND IMAGING REVIEWED IN EPIC          Assessment:   1.  cT2, N1, M0 stage IIB ER/HI negative, HER2 positive multifocal invasive ductal carcinoma of the right breast diagnosed in 2016. Complete pathologic response to neoadjuvant therapy.        Plan:       Patient had early stage breast cancer,  underwent neoadjuvant TCHP x6 cycles in 2016.  Had a complete pathologic response, mammogram was done in December of 2016.    She then completed radiation and maintenance Herceptin.    She has been on surveillance since that time.  She recently moved to [place].  She wanted to establish care here.    She is due for mammogram in October of this year, already ordered.    I will get blood work today including tumor markers.    She will return to clinic in 1 year with blood work and tumor markers.      Jesús Blanchard II, MD

## 2022-07-27 ENCOUNTER — OFFICE VISIT (OUTPATIENT)
Dept: HEMATOLOGY/ONCOLOGY | Facility: CLINIC | Age: 67
End: 2022-07-27
Payer: COMMERCIAL

## 2022-07-27 VITALS
BODY MASS INDEX: 25.32 KG/M2 | HEIGHT: 60 IN | HEART RATE: 77 BPM | OXYGEN SATURATION: 97 % | WEIGHT: 129 LBS | SYSTOLIC BLOOD PRESSURE: 128 MMHG | RESPIRATION RATE: 18 BRPM | DIASTOLIC BLOOD PRESSURE: 79 MMHG | TEMPERATURE: 98 F

## 2022-07-27 DIAGNOSIS — Z85.3 PERSONAL HISTORY OF BREAST CANCER: ICD-10-CM

## 2022-07-27 DIAGNOSIS — Z85.3 HX OF BREAST CANCER: ICD-10-CM

## 2022-07-27 DIAGNOSIS — Z90.11 HISTORY OF RIGHT MASTECTOMY: ICD-10-CM

## 2022-07-27 LAB
ALBUMIN SERPL-MCNC: 4.2 GM/DL (ref 3.4–4.8)
ALBUMIN/GLOB SERPL: 1.6 RATIO (ref 1.1–2)
ALP SERPL-CCNC: 88 UNIT/L (ref 40–150)
ALT SERPL-CCNC: 20 UNIT/L (ref 0–55)
AST SERPL-CCNC: 23 UNIT/L (ref 5–34)
BASOPHILS # BLD AUTO: 0.02 X10(3)/MCL (ref 0–0.2)
BASOPHILS NFR BLD AUTO: 0.4 %
BILIRUBIN DIRECT+TOT PNL SERPL-MCNC: 1.1 MG/DL
BUN SERPL-MCNC: 21 MG/DL (ref 9.8–20.1)
CALCIUM SERPL-MCNC: 9.2 MG/DL (ref 8.4–10.2)
CEA SERPL-MCNC: <1.73 NG/ML (ref 0–3)
CHLORIDE SERPL-SCNC: 105 MMOL/L (ref 98–107)
CO2 SERPL-SCNC: 25 MMOL/L (ref 23–31)
CREAT SERPL-MCNC: 0.71 MG/DL (ref 0.55–1.02)
EOSINOPHIL # BLD AUTO: 0.12 X10(3)/MCL (ref 0–0.9)
EOSINOPHIL NFR BLD AUTO: 2.3 %
ERYTHROCYTE [DISTWIDTH] IN BLOOD BY AUTOMATED COUNT: 12.4 % (ref 11.5–17)
GLOBULIN SER-MCNC: 2.7 GM/DL (ref 2.4–3.5)
GLUCOSE SERPL-MCNC: 97 MG/DL (ref 82–115)
HCT VFR BLD AUTO: 41.1 % (ref 37–47)
HGB BLD-MCNC: 13.6 GM/DL (ref 12–16)
IMM GRANULOCYTES # BLD AUTO: 0.01 X10(3)/MCL (ref 0–0.04)
IMM GRANULOCYTES NFR BLD AUTO: 0.2 %
LYMPHOCYTES # BLD AUTO: 1.57 X10(3)/MCL (ref 0.6–4.6)
LYMPHOCYTES NFR BLD AUTO: 30.7 %
MCH RBC QN AUTO: 32.1 PG (ref 27–31)
MCHC RBC AUTO-ENTMCNC: 33.1 MG/DL (ref 33–36)
MCV RBC AUTO: 96.9 FL (ref 80–94)
MONOCYTES # BLD AUTO: 0.42 X10(3)/MCL (ref 0.1–1.3)
MONOCYTES NFR BLD AUTO: 8.2 %
NEUTROPHILS # BLD AUTO: 3 X10(3)/MCL (ref 2.1–9.2)
NEUTROPHILS NFR BLD AUTO: 58.2 %
PLATELET # BLD AUTO: 234 X10(3)/MCL (ref 130–400)
PMV BLD AUTO: 10.8 FL (ref 7.4–10.4)
POTASSIUM SERPL-SCNC: 3.8 MMOL/L (ref 3.5–5.1)
PROT SERPL-MCNC: 6.9 GM/DL (ref 5.8–7.6)
RBC # BLD AUTO: 4.24 X10(6)/MCL (ref 4.2–5.4)
SODIUM SERPL-SCNC: 140 MMOL/L (ref 136–145)
WBC # SPEC AUTO: 5.1 X10(3)/MCL (ref 4.5–11.5)

## 2022-07-27 PROCEDURE — 1159F PR MEDICATION LIST DOCUMENTED IN MEDICAL RECORD: ICD-10-PCS | Mod: CPTII,S$GLB,, | Performed by: INTERNAL MEDICINE

## 2022-07-27 PROCEDURE — 82378 CARCINOEMBRYONIC ANTIGEN: CPT | Performed by: INTERNAL MEDICINE

## 2022-07-27 PROCEDURE — 99999 PR PBB SHADOW E&M-EST. PATIENT-LVL V: ICD-10-PCS | Mod: PBBFAC,,, | Performed by: INTERNAL MEDICINE

## 2022-07-27 PROCEDURE — 3078F PR MOST RECENT DIASTOLIC BLOOD PRESSURE < 80 MM HG: ICD-10-PCS | Mod: CPTII,S$GLB,, | Performed by: INTERNAL MEDICINE

## 2022-07-27 PROCEDURE — 36415 COLL VENOUS BLD VENIPUNCTURE: CPT | Performed by: INTERNAL MEDICINE

## 2022-07-27 PROCEDURE — 1160F PR REVIEW ALL MEDS BY PRESCRIBER/CLIN PHARMACIST DOCUMENTED: ICD-10-PCS | Mod: CPTII,S$GLB,, | Performed by: INTERNAL MEDICINE

## 2022-07-27 PROCEDURE — 3078F DIAST BP <80 MM HG: CPT | Mod: CPTII,S$GLB,, | Performed by: INTERNAL MEDICINE

## 2022-07-27 PROCEDURE — 99999 PR PBB SHADOW E&M-EST. PATIENT-LVL V: CPT | Mod: PBBFAC,,, | Performed by: INTERNAL MEDICINE

## 2022-07-27 PROCEDURE — 3008F BODY MASS INDEX DOCD: CPT | Mod: CPTII,S$GLB,, | Performed by: INTERNAL MEDICINE

## 2022-07-27 PROCEDURE — 99205 OFFICE O/P NEW HI 60 MIN: CPT | Mod: S$GLB,,, | Performed by: INTERNAL MEDICINE

## 2022-07-27 PROCEDURE — 1160F RVW MEDS BY RX/DR IN RCRD: CPT | Mod: CPTII,S$GLB,, | Performed by: INTERNAL MEDICINE

## 2022-07-27 PROCEDURE — 3074F SYST BP LT 130 MM HG: CPT | Mod: CPTII,S$GLB,, | Performed by: INTERNAL MEDICINE

## 2022-07-27 PROCEDURE — 85025 COMPLETE CBC W/AUTO DIFF WBC: CPT | Performed by: INTERNAL MEDICINE

## 2022-07-27 PROCEDURE — 80053 COMPREHEN METABOLIC PANEL: CPT | Performed by: INTERNAL MEDICINE

## 2022-07-27 PROCEDURE — 3074F PR MOST RECENT SYSTOLIC BLOOD PRESSURE < 130 MM HG: ICD-10-PCS | Mod: CPTII,S$GLB,, | Performed by: INTERNAL MEDICINE

## 2022-07-27 PROCEDURE — 3008F PR BODY MASS INDEX (BMI) DOCUMENTED: ICD-10-PCS | Mod: CPTII,S$GLB,, | Performed by: INTERNAL MEDICINE

## 2022-07-27 PROCEDURE — 99205 PR OFFICE/OUTPT VISIT, NEW, LEVL V, 60-74 MIN: ICD-10-PCS | Mod: S$GLB,,, | Performed by: INTERNAL MEDICINE

## 2022-07-27 PROCEDURE — 1159F MED LIST DOCD IN RCRD: CPT | Mod: CPTII,S$GLB,, | Performed by: INTERNAL MEDICINE

## 2022-07-28 LAB — CANCER AG15-3 SERPL IA-ACNC: 13 U/ML

## 2022-08-02 DIAGNOSIS — F40.298 NEEDLE PHOBIA: ICD-10-CM

## 2022-08-02 DIAGNOSIS — M05.9 SEROPOSITIVE RHEUMATOID ARTHRITIS: ICD-10-CM

## 2022-08-02 RX ORDER — UPADACITINIB 15 MG/1
15 TABLET, EXTENDED RELEASE ORAL DAILY
Qty: 30 TABLET | Refills: 11 | Status: SHIPPED | OUTPATIENT
Start: 2022-08-02 | End: 2023-03-06

## 2022-08-12 ENCOUNTER — TELEPHONE (OUTPATIENT)
Dept: RHEUMATOLOGY | Facility: CLINIC | Age: 67
End: 2022-08-12
Payer: COMMERCIAL

## 2022-08-12 NOTE — TELEPHONE ENCOUNTER
Pt states that they are going to be going on a cruise in [place] and is wanting to know if you can write for Paloma for her.   Please advise  Thanks.

## 2022-08-15 DIAGNOSIS — M05.9 SEROPOSITIVE RHEUMATOID ARTHRITIS: Primary | ICD-10-CM

## 2022-08-15 DIAGNOSIS — M79.7 FIBROMYALGIA SYNDROME: ICD-10-CM

## 2022-08-15 DIAGNOSIS — F51.01 PRIMARY INSOMNIA: ICD-10-CM

## 2022-08-16 ENCOUNTER — TELEPHONE (OUTPATIENT)
Dept: RHEUMATOLOGY | Facility: CLINIC | Age: 67
End: 2022-08-16
Payer: COMMERCIAL

## 2022-08-16 DIAGNOSIS — M05.9 SEROPOSITIVE RHEUMATOID ARTHRITIS: Primary | ICD-10-CM

## 2022-08-16 DIAGNOSIS — F51.01 PRIMARY INSOMNIA: ICD-10-CM

## 2022-08-16 DIAGNOSIS — M79.7 FIBROMYALGIA SYNDROME: ICD-10-CM

## 2022-08-16 RX ORDER — DARIDOREXANT 25 MG/1
25 TABLET, FILM COATED ORAL NIGHTLY
Qty: 30 TABLET | Refills: 5 | Status: SHIPPED | OUTPATIENT
Start: 2022-08-16 | End: 2022-10-18

## 2022-08-16 NOTE — TELEPHONE ENCOUNTER
Pt is wondering what your thoughts on on this new sleep medication Quviviq  Pt is having a lot of trouble sleeping and has been taking natural supplements to help her but has not had any positive outcome. Was on Ambien in the past but ended up having a reaction.   Pt is wanting to know if you can write this for this pt if she would benefit from this medication.    ----- Message from Meredith Washington sent at 8/16/2022 11:24 AM CDT -----  Regarding: injection  Patient called stating that she had spoken to a nurse on Friday concerning injection medication. Please call her back. She also has another question. [phone]

## 2022-08-16 NOTE — TELEPHONE ENCOUNTER
Spoke with patient and she is stating that she already tried the Lunesta in the past .. Please Advise.. thanks

## 2022-08-18 ENCOUNTER — TELEPHONE (OUTPATIENT)
Dept: RHEUMATOLOGY | Facility: CLINIC | Age: 67
End: 2022-08-18
Payer: COMMERCIAL

## 2022-08-18 ENCOUNTER — CLINICAL SUPPORT (OUTPATIENT)
Dept: FAMILY MEDICINE | Facility: CLINIC | Age: 67
End: 2022-08-18
Payer: COMMERCIAL

## 2022-08-18 DIAGNOSIS — M79.7 FIBROMYALGIA SYNDROME: ICD-10-CM

## 2022-08-18 DIAGNOSIS — M05.9 SEROPOSITIVE RHEUMATOID ARTHRITIS: ICD-10-CM

## 2022-08-18 DIAGNOSIS — F51.01 PRIMARY INSOMNIA: ICD-10-CM

## 2022-08-18 NOTE — TELEPHONE ENCOUNTER
----- Message from Toshia Mcintyre sent at 8/18/2022  8:33 AM CDT -----  Regarding: RE: Quviviq   I was able to get a coupon that brought the first fill down to $0. But after that it shows there will be a coupon limit but doesn't tell me how much. I did speak to the patient and had gotten a list of the things she tried. I don't mind working on an appeal if we can get some info about why this is the better choice for her. Just let me know.    ----- Message -----  From: Darvin Rosenthal MD  Sent: 8/17/2022   4:27 PM CDT  To: Toshia Mcintyre  Subject: RE: Quviviq                                      She tried ambien and lunesta and did not improve. I can try her on temazepam. Please Kathi or Bharti check with the patient if she is ok getting on temazepam since the other med is not covered. She may also check on good rx how much it would cost her. Thanks bh   ----- Message -----  From: Toshia Mcintyre  Sent: 8/17/2022   9:30 AM CDT  To: Darvin Rosenthal MD  Subject: Quviviq                                          Good morning, I was trying to complete the Prior Authorization for this patients Quviviq but this drug is not on her formulary. I called to speak to her insurance and the only way I can try for a coverage exception is if she has tried and failed or has a contraindication for all the following doxepin, estazolam, eszopiclone, flurazepam, quazepam, ramelteon, temazepam, triazolam, zaleplon, zolpidem/ER, BELSOMRA, DAYVIGO, RESTORIL, SILENOR, or ZOLPIMIST. Please let me know what you would like me to do. I did try to contact Hemant to talk with her about it but I had to leave a message.

## 2022-08-19 PROBLEM — M05.9 SEROPOSITIVE RHEUMATOID ARTHRITIS: Status: ACTIVE | Noted: 2022-08-19

## 2022-08-19 PROBLEM — M79.7 FIBROMYALGIA SYNDROME: Status: ACTIVE | Noted: 2022-08-19

## 2022-08-19 RX ORDER — EPINEPHRINE 0.3 MG/.3ML
0.3 INJECTION SUBCUTANEOUS
Status: CANCELLED | OUTPATIENT
Start: 2022-08-19

## 2022-08-19 RX ORDER — ALBUTEROL SULFATE 90 UG/1
2 AEROSOL, METERED RESPIRATORY (INHALATION)
Status: CANCELLED | OUTPATIENT
Start: 2022-08-19

## 2022-08-19 RX ORDER — ACETAMINOPHEN 325 MG/1
650 TABLET ORAL ONCE
Status: CANCELLED | OUTPATIENT
Start: 2022-08-19 | End: 2022-08-19

## 2022-08-19 RX ORDER — DIPHENHYDRAMINE HCL 25 MG
25 CAPSULE ORAL ONCE
Status: CANCELLED | OUTPATIENT
Start: 2022-08-19 | End: 2022-08-19

## 2022-08-19 RX ORDER — PREDNISONE 1 MG/1
40 TABLET ORAL ONCE
Status: CANCELLED | OUTPATIENT
Start: 2022-08-19 | End: 2022-08-19

## 2022-08-19 RX ORDER — ONDANSETRON 4 MG/1
4 TABLET, ORALLY DISINTEGRATING ORAL ONCE
Status: CANCELLED | OUTPATIENT
Start: 2022-08-19 | End: 2022-08-19

## 2022-08-22 NOTE — PROGRESS NOTES
Patient received two gluteal intramuscular injections (left and right) of tixagevimab(right)/cilgavimab(left) ([facility]) with no difficulties tolerating the medication  Given 8/19/22 @ 1:19 pm

## 2022-08-22 NOTE — PROGRESS NOTES
Attempting to complete order for Evusheld. Nursing and pharmacy having difficulty entering. Medication rejections states this visit requires a progress note. Entering [facility] and attempting to complete the requirements for the Evusheld therapy plan order.

## 2022-08-27 ENCOUNTER — PATIENT MESSAGE (OUTPATIENT)
Dept: RHEUMATOLOGY | Facility: CLINIC | Age: 67
End: 2022-08-27
Payer: COMMERCIAL

## 2022-08-27 DIAGNOSIS — M79.7 FIBROMYALGIA SYNDROME: ICD-10-CM

## 2022-08-27 DIAGNOSIS — M05.9 SEROPOSITIVE RHEUMATOID ARTHRITIS: ICD-10-CM

## 2022-08-27 DIAGNOSIS — F51.01 PRIMARY INSOMNIA: ICD-10-CM

## 2022-08-31 ENCOUNTER — PATIENT MESSAGE (OUTPATIENT)
Dept: RHEUMATOLOGY | Facility: CLINIC | Age: 67
End: 2022-08-31
Payer: COMMERCIAL

## 2022-08-31 NOTE — TELEPHONE ENCOUNTER
I would like his opinion on a different sleep medication not just more of the muscle relaxant. Something to get to sleep and stay asleep would be great, I have tried ambien and Lunesta.

## 2022-09-01 ENCOUNTER — PATIENT MESSAGE (OUTPATIENT)
Dept: RHEUMATOLOGY | Facility: CLINIC | Age: 67
End: 2022-09-01
Payer: COMMERCIAL

## 2022-09-01 RX ORDER — CLONAZEPAM 0.5 MG/1
0.5 TABLET ORAL NIGHTLY
Qty: 30 TABLET | Refills: 5 | Status: SHIPPED | OUTPATIENT
Start: 2022-09-01 | End: 2022-10-18

## 2022-10-18 ENCOUNTER — OFFICE VISIT (OUTPATIENT)
Dept: RHEUMATOLOGY | Facility: CLINIC | Age: 67
End: 2022-10-18
Payer: COMMERCIAL

## 2022-10-18 VITALS
SYSTOLIC BLOOD PRESSURE: 144 MMHG | DIASTOLIC BLOOD PRESSURE: 72 MMHG | OXYGEN SATURATION: 97 % | HEIGHT: 60 IN | HEART RATE: 82 BPM | BODY MASS INDEX: 25.36 KG/M2 | RESPIRATION RATE: 20 BRPM | WEIGHT: 129.19 LBS | TEMPERATURE: 98 F

## 2022-10-18 DIAGNOSIS — M05.9 SEROPOSITIVE RHEUMATOID ARTHRITIS: Primary | ICD-10-CM

## 2022-10-18 DIAGNOSIS — M79.7 FIBROMYALGIA SYNDROME: ICD-10-CM

## 2022-10-18 DIAGNOSIS — F51.01 PRIMARY INSOMNIA: ICD-10-CM

## 2022-10-18 PROCEDURE — 3077F SYST BP >= 140 MM HG: CPT | Mod: CPTII,S$GLB,, | Performed by: INTERNAL MEDICINE

## 2022-10-18 PROCEDURE — 1159F PR MEDICATION LIST DOCUMENTED IN MEDICAL RECORD: ICD-10-PCS | Mod: CPTII,S$GLB,, | Performed by: INTERNAL MEDICINE

## 2022-10-18 PROCEDURE — 99214 OFFICE O/P EST MOD 30 MIN: CPT | Mod: S$GLB,,, | Performed by: INTERNAL MEDICINE

## 2022-10-18 PROCEDURE — 1159F MED LIST DOCD IN RCRD: CPT | Mod: CPTII,S$GLB,, | Performed by: INTERNAL MEDICINE

## 2022-10-18 PROCEDURE — 3077F PR MOST RECENT SYSTOLIC BLOOD PRESSURE >= 140 MM HG: ICD-10-PCS | Mod: CPTII,S$GLB,, | Performed by: INTERNAL MEDICINE

## 2022-10-18 PROCEDURE — 99999 PR PBB SHADOW E&M-EST. PATIENT-LVL IV: ICD-10-PCS | Mod: PBBFAC,,, | Performed by: INTERNAL MEDICINE

## 2022-10-18 PROCEDURE — 3078F PR MOST RECENT DIASTOLIC BLOOD PRESSURE < 80 MM HG: ICD-10-PCS | Mod: CPTII,S$GLB,, | Performed by: INTERNAL MEDICINE

## 2022-10-18 PROCEDURE — 99999 PR PBB SHADOW E&M-EST. PATIENT-LVL IV: CPT | Mod: PBBFAC,,, | Performed by: INTERNAL MEDICINE

## 2022-10-18 PROCEDURE — 99214 PR OFFICE/OUTPT VISIT, EST, LEVL IV, 30-39 MIN: ICD-10-PCS | Mod: S$GLB,,, | Performed by: INTERNAL MEDICINE

## 2022-10-18 PROCEDURE — 3078F DIAST BP <80 MM HG: CPT | Mod: CPTII,S$GLB,, | Performed by: INTERNAL MEDICINE

## 2022-10-18 RX ORDER — ZALEPLON 10 MG/1
10 CAPSULE ORAL NIGHTLY
Qty: 7 CAPSULE | Refills: 0 | Status: SHIPPED | OUTPATIENT
Start: 2022-10-18 | End: 2022-11-17

## 2022-10-18 RX ORDER — OMEPRAZOLE 40 MG/1
40 CAPSULE, DELAYED RELEASE ORAL EVERY MORNING
Qty: 90 CAPSULE | Refills: 3 | Status: SHIPPED | OUTPATIENT
Start: 2022-10-18 | End: 2023-03-06 | Stop reason: SDUPTHER

## 2022-10-18 RX ORDER — MELOXICAM 7.5 MG/1
7.5 TABLET ORAL DAILY
Qty: 270 TABLET | Refills: 3 | Status: SHIPPED | OUTPATIENT
Start: 2022-10-18 | End: 2023-03-06

## 2022-10-18 RX ORDER — TIZANIDINE 4 MG/1
4 TABLET ORAL NIGHTLY
Qty: 90 TABLET | Refills: 3 | Status: SHIPPED | OUTPATIENT
Start: 2022-10-18 | End: 2023-03-06 | Stop reason: SDUPTHER

## 2022-10-18 NOTE — PROGRESS NOTES
Subjective:       Patient ID: Hemant Eid is a 66 y.o. female.    Chief Complaint: Follow-up (GENERALIZED PAIN /PAST 3 WEEKS PATIENT HASN'T BEEN ON RINVOQ)    The patient is complaining of joint pain involving the MCP PIP wrist elbow shoulders hips knees and ankles bilaterally.  The pain is 3/10 in intensity dull in quality and continuous.  That is associated with a morning stiffness lasting for more than 60 minutes.  Is also having difficulty maintaining a good night of sleep.  This has been associated with myalgias.  Muscle aches are 3/10 in intensity dull in quality and continuous.  They are associated with fatigue.  No fever no chills no others.  Rinvoq did not help    Review of Systems   Constitutional:  Negative for appetite change, chills and fever.   HENT:  Negative for congestion, ear pain, mouth sores, nosebleeds and trouble swallowing.    Eyes:  Negative for photophobia and discharge.   Respiratory:  Negative for chest tightness and shortness of breath.    Cardiovascular:  Negative for chest pain.   Gastrointestinal:  Negative for abdominal pain and vomiting.   Endocrine: Negative.    Genitourinary:  Negative for hematuria.   Musculoskeletal:         As per HPI   Skin:  Negative for rash.   Neurological:  Negative for weakness.       Objective:   BP (!) 144/72 (BP Location: Left arm, Patient Position: Sitting, BP Method: Medium (Automatic))   Pulse 82   Temp 97.9 °F (36.6 °C) (Oral)   Resp 20   Ht 5' (1.524 m)   Wt 58.6 kg (129 lb 3.2 oz)   SpO2 97%   BMI 25.23 kg/m²      Physical Exam   Constitutional: She is oriented to person, place, and time. She appears well-developed and well-nourished. No distress.   HENT:   Head: Normocephalic and atraumatic.   Right Ear: External ear normal.   Left Ear: External ear normal.   Eyes: Pupils are equal, round, and reactive to light.   Cardiovascular: Normal rate, regular rhythm and normal heart sounds.   Pulmonary/Chest: Breath sounds normal.   Abdominal:  Soft. There is no abdominal tenderness.   Musculoskeletal:      Right shoulder: Tenderness present.      Left shoulder: Tenderness present.      Right elbow: Tenderness present.      Left elbow: Tenderness present.      Right wrist: Tenderness present.      Left wrist: Tenderness present.      Cervical back: Neck supple.      Right hip: Tenderness present.      Left hip: Tenderness present.      Right knee: Tenderness present.      Left knee: Tenderness present.      Right ankle: Tenderness present.      Left ankle: Tenderness present.   Lymphadenopathy:     She has no cervical adenopathy.   Neurological: She is alert and oriented to person, place, and time. She displays normal reflexes. No cranial nerve deficit or sensory deficit. She exhibits normal muscle tone. Coordination normal.   Skin: No rash noted. No erythema.   Vitals reviewed.      Right Side Rheumatological Exam     The patient is tender to palpation of the shoulder, elbow, wrist, knee, 1st PIP, 1st MCP, 2nd PIP, 2nd MCP, 3rd PIP, 3rd MCP, 4th PIP, 4th MCP, 5th PIP, hip, ankle, 1st MTP, 2nd MTP, 3rd MTP, 4th MTP, 5th MTP, 1st toe IP, 2nd toe IP, 3rd toe IP, 4th toe IP and 5th toe IP    Left Side Rheumatological Exam     The patient is tender to palpation of the shoulder, elbow, wrist, knee, 1st PIP, 1st MCP, 2nd PIP, 2nd MCP, 3rd PIP, 3rd MCP, 4th PIP, 4th MCP, 5th PIP, 5th MCP, hip, ankle, 1st MTP, 2nd MTP, 3rd MTP, 4th MTP, 5th MTP, 1st toe IP, 2nd toe IP, 3rd toe IP, 4th toe IP and 5th toe IP.       Completed Fibromyalgia exam 18/18 tender points.  No data to display     Assessment:       1. Seropositive rheumatoid arthritis    2. Fibromyalgia syndrome    3. Primary insomnia              Plan:       Problem List Items Addressed This Visit          Immunology/Multi System    Seropositive rheumatoid arthritis - Primary    Relevant Medications    meloxicam (MOBIC) 7.5 MG tablet    omeprazole (PRILOSEC) 40 MG capsule    tiZANidine (ZANAFLEX) 4 MG tablet     zaleplon (SONATA) 10 MG capsule       Orthopedic    Fibromyalgia syndrome    Relevant Medications    meloxicam (MOBIC) 7.5 MG tablet    omeprazole (PRILOSEC) 40 MG capsule    tiZANidine (ZANAFLEX) 4 MG tablet    zaleplon (SONATA) 10 MG capsule     Other Visit Diagnoses       Primary insomnia        Relevant Medications    meloxicam (MOBIC) 7.5 MG tablet    omeprazole (PRILOSEC) 40 MG capsule    tiZANidine (ZANAFLEX) 4 MG tablet    zaleplon (SONATA) 10 MG capsule

## 2022-10-20 ENCOUNTER — PATIENT MESSAGE (OUTPATIENT)
Dept: RHEUMATOLOGY | Facility: CLINIC | Age: 67
End: 2022-10-20
Payer: COMMERCIAL

## 2022-10-20 DIAGNOSIS — F51.01 PRIMARY INSOMNIA: ICD-10-CM

## 2022-10-20 DIAGNOSIS — M05.9 SEROPOSITIVE RHEUMATOID ARTHRITIS: Primary | ICD-10-CM

## 2022-10-20 NOTE — TELEPHONE ENCOUNTER
Hi I had an appointment with the doctor on Tuesday and he said he was going to prescribe 2 mediations 1 for sleep and 1 for my rheumatoid arthritis, well the 1 for sleep is at the pharmacy to  but the other one is not and I'm sorry but I don't remember the name of it. Maybe you can talk to him about it. Thank you

## 2022-10-20 NOTE — TELEPHONE ENCOUNTER
In the addition to the new sleeping medication, The recent clinic note advises her to continue meloxicam, ompeprazole and Rinvoq. Is it one of these medications that she needs a rx for, or is a new medication that he was going to start? If it is a new medication, we'll have to wait until Monday to discuss with Dr. Rosenthal.

## 2022-10-21 NOTE — TELEPHONE ENCOUNTER
Was it an injectable medication like Humira or Enbrel? I don't see the reference to a new medication in the chart, we may need to wait until Monday to discuss with Dr. Rosenthal. Thanks

## 2022-10-24 NOTE — TELEPHONE ENCOUNTER
If he said it would help lose weight it is most probably luflenomide, but will confirm with Dr. Rosenthal on Monday. Thanks

## 2022-10-26 RX ORDER — ZOLPIDEM TARTRATE 10 MG/1
10 TABLET ORAL NIGHTLY
Qty: 30 TABLET | Refills: 5 | Status: SHIPPED | OUTPATIENT
Start: 2022-10-26 | End: 2023-03-06 | Stop reason: SDUPTHER

## 2022-10-26 RX ORDER — LEFLUNOMIDE 20 MG/1
20 TABLET ORAL
Qty: 30 TABLET | Refills: 11 | Status: SHIPPED | OUTPATIENT
Start: 2022-10-26 | End: 2023-03-06 | Stop reason: SDUPTHER

## 2022-10-26 NOTE — TELEPHONE ENCOUNTER
Hi I was wondering if anyone asked the doctor about any medication for my arthritis. If nothing else I can go back on the methotrexate like before. I was on folic acid daily and 8 methotrexate pills once a week. Also for sleep the sonata works fair not great, so ambien might be better for me.Thank you

## 2022-12-29 ENCOUNTER — HOSPITAL ENCOUNTER (OUTPATIENT)
Dept: RADIOLOGY | Facility: HOSPITAL | Age: 67
Discharge: HOME | End: 2022-12-29
Attending: PHYSICIAN ASSISTANT
Payer: COMMERCIAL

## 2022-12-29 ENCOUNTER — HOSPITAL ENCOUNTER (OUTPATIENT)
Dept: RADIOLOGY | Facility: HOSPITAL | Age: 67
Discharge: HOME | End: 2022-12-29
Attending: PHYSICIAN ASSISTANT
Payer: MEDICARE

## 2022-12-29 VITALS — BODY MASS INDEX: 25.13 KG/M2 | HEIGHT: 60 IN | WEIGHT: 128 LBS

## 2022-12-29 DIAGNOSIS — N64.4 BREAST PAIN: ICD-10-CM

## 2022-12-29 DIAGNOSIS — N64.4 BREAST PAIN, RIGHT: ICD-10-CM

## 2022-12-29 DIAGNOSIS — R92.8 ABNORMAL MAMMOGRAM: ICD-10-CM

## 2022-12-29 PROCEDURE — 76641 ULTRASOUND BREAST COMPLETE: CPT | Mod: TC,LT

## 2022-12-29 PROCEDURE — 76882 US LMTD JT/FCL EVL NVASC XTR: CPT | Mod: TC,RT

## 2022-12-29 PROCEDURE — 77062 BREAST TOMOSYNTHESIS BI: CPT | Mod: 26,,, | Performed by: RADIOLOGY

## 2022-12-29 PROCEDURE — 77062 MAMMO DIGITAL DIAGNOSTIC BILAT WITH TOMO: ICD-10-PCS | Mod: 26,,, | Performed by: RADIOLOGY

## 2022-12-29 PROCEDURE — 76641 US BREAST LEFT COMPLETE: ICD-10-PCS | Mod: 26,LT,, | Performed by: RADIOLOGY

## 2022-12-29 PROCEDURE — 76641 ULTRASOUND BREAST COMPLETE: CPT | Mod: 26,LT,, | Performed by: RADIOLOGY

## 2022-12-29 PROCEDURE — 77062 BREAST TOMOSYNTHESIS BI: CPT | Mod: TC

## 2022-12-29 PROCEDURE — 76882 US AXILLA ONLY (BREAST IMAGING) RIGHT: ICD-10-PCS | Mod: 26,59,RT, | Performed by: RADIOLOGY

## 2022-12-29 PROCEDURE — 76882 US LMTD JT/FCL EVL NVASC XTR: CPT | Mod: 26,59,RT, | Performed by: RADIOLOGY

## 2022-12-29 PROCEDURE — 77066 MAMMO DIGITAL DIAGNOSTIC BILAT WITH TOMO: ICD-10-PCS | Mod: 26,,, | Performed by: RADIOLOGY

## 2022-12-29 PROCEDURE — 77066 DX MAMMO INCL CAD BI: CPT | Mod: 26,,, | Performed by: RADIOLOGY

## 2022-12-30 ENCOUNTER — PATIENT MESSAGE (OUTPATIENT)
Dept: SURGERY | Facility: CLINIC | Age: 67
End: 2022-12-30
Payer: MEDICARE

## 2023-01-03 ENCOUNTER — TELEPHONE (OUTPATIENT)
Dept: SURGERY | Facility: CLINIC | Age: 68
End: 2023-01-03
Payer: MEDICARE

## 2023-01-03 DIAGNOSIS — Z85.3 PERSONAL HISTORY OF BREAST CANCER: Primary | ICD-10-CM

## 2023-01-03 NOTE — TELEPHONE ENCOUNTER
I called patient due to canceled appt (1/4/23 @ 1:40pm) with CHASE Kay PA-C and she stated that she canceled the appt after speaking with her daughter who's a sonographer about her ultrasound results. Her daughter ensured her that the cyst was too small to be concerned about and that nothing will be done due to there size. Patient stated that she will have an MRI of Bilateral Breast scheduled in 6 months so I instructed her to call after the MRI is scheduled so that we can schedule a followup appt for the results. Patient verbalized understanding.

## 2023-03-03 PROBLEM — F51.01 PRIMARY INSOMNIA: Status: ACTIVE | Noted: 2023-03-03

## 2023-03-03 NOTE — ASSESSMENT & PLAN NOTE
Continue leflunomide 20 mg daily  Stop Rinvoq   Continue meloxicam 7.5 mg daily p.r.n.  Continue omeprazole 40 mg daily  Continue tramadol 50 mg t.i.d. p.r.n.    Start Humira 40 mg Q 14 day   TB Test negative    Rheumatoid Arthritis resistant to Methotrexate, Plaquenil, Rinvoq, leflunomide NSAIDS, Steroids      Labs ordered today for continued monitoring

## 2023-03-03 NOTE — PROGRESS NOTES
Subjective:           Patient ID: Hemant Eid is a 67 y.o. female.    Chief Complaint: Follow-up (Patient is a 4 month f/u for RA.  )      Ms. Eid is a 67-year-old female here for follow-up.  She established care with Dr. Rosenthal on 06/24/2022. At last visit leflunomide was added at her last visit. She stopped taking Rinvoq when she was prescribed the leflunomide. History of breast cancer. S/p mastectomy, radiation, and chemo.  She is reporting joint pain involving the MCP PIP wrist elbow shoulders hips knees and ankles bilaterally.  The pain is 7/10 in intensity dull in quality and continuous.  That is associated with a morning stiffness lasting for more than 60 minutes.  Is also having difficulty maintaining a good night of sleep. This has been associated with myalgias.  Muscle aches are 7/10 in intensity dull in quality and continuous.  Denies fever and chills.        Review of Systems   Constitutional:  Negative for appetite change, chills and fever.   HENT:  Negative for congestion, ear pain, mouth sores, nosebleeds and trouble swallowing.    Eyes:  Negative for photophobia and discharge.   Respiratory:  Negative for chest tightness and shortness of breath.    Cardiovascular:  Negative for chest pain.   Gastrointestinal:  Negative for abdominal pain and vomiting.   Endocrine: Negative.    Genitourinary:  Negative for hematuria.   Musculoskeletal:         As per HPI   Skin:  Negative for rash.   Neurological:  Negative for weakness.       Objective:   /75 (BP Location: Left arm, Patient Position: Sitting, BP Method: Medium (Automatic))   Pulse 72   Temp 98.2 °F (36.8 °C) (Oral)   Resp 16   Ht 5' (1.524 m)   Wt 55.8 kg (123 lb)   SpO2 99%   BMI 24.02 kg/m²          Physical Exam   Constitutional: She is oriented to person, place, and time. She appears well-developed and well-nourished. No distress.   HENT:   Head: Normocephalic and atraumatic.   Right Ear: External ear normal.   Left Ear:  External ear normal.   Eyes: Pupils are equal, round, and reactive to light.   Cardiovascular: Normal rate, regular rhythm and normal heart sounds.   Pulmonary/Chest: Breath sounds normal.   Abdominal: Soft. There is no abdominal tenderness.   Musculoskeletal:      Right shoulder: Tenderness present.      Left shoulder: Tenderness present.      Right elbow: Tenderness present.      Left elbow: Tenderness present.      Right wrist: Tenderness present.      Left wrist: Tenderness present.      Cervical back: Neck supple.      Right hip: Tenderness present.      Left hip: Tenderness present.      Right knee: Tenderness present.      Left knee: Tenderness present.      Right ankle: Tenderness present.      Left ankle: Tenderness present.   Lymphadenopathy:     She has no cervical adenopathy.   Neurological: She is alert and oriented to person, place, and time. She displays normal reflexes. No cranial nerve deficit or sensory deficit. She exhibits normal muscle tone. Coordination normal.   Skin: No rash noted. No erythema.   Vitals reviewed.      Right Side Rheumatological Exam     The patient is tender to palpation of the shoulder, elbow, wrist, knee, 1st PIP, 1st MCP, 2nd PIP, 2nd MCP, 3rd PIP, 3rd MCP, 4th PIP, 4th MCP, 5th PIP, hip, ankle, 1st MTP, 2nd MTP, 3rd MTP, 4th MTP, 5th MTP, 1st toe IP, 2nd toe IP, 3rd toe IP, 4th toe IP and 5th toe IP    Left Side Rheumatological Exam     The patient is tender to palpation of the shoulder, elbow, wrist, knee, 1st PIP, 1st MCP, 2nd PIP, 2nd MCP, 3rd PIP, 3rd MCP, 4th PIP, 4th MCP, 5th PIP, 5th MCP, hip, ankle, 1st MTP, 2nd MTP, 3rd MTP, 4th MTP, 5th MTP, 1st toe IP, 2nd toe IP, 3rd toe IP, 4th toe IP and 5th toe IP.         Completed Fibromyalgia exam 18/18 tender points.    No data to display     Assessment:         Medication List with Changes/Refills   New Medications    ADALIMUMAB (HUMIRA,CF, PEN) 40 MG/0.4 ML PNKT    Inject 0.4 mLs (40 mg total) into the skin every 14  (fourteen) days.       Start Date: 3/6/2023  End Date: 5/29/2023    DICLOFENAC (VOLTAREN) 75 MG EC TABLET    Take 1 tablet (75 mg total) by mouth 2 (two) times daily.       Start Date: 3/6/2023  End Date: --   Current Medications    ACETAMINOPHEN (TYLENOL) 650 MG TBSR    Take 650 mg by mouth once daily.       Start Date: --        End Date: --    ATORVASTATIN (LIPITOR) 40 MG TABLET    Take 40 mg by mouth once daily. for 90 days       Start Date: 5/13/2022 End Date: --    DICLOFENAC SODIUM (SOLARAZE) 3 % GEL    APPLY 1 GRAM 3-4 TIMES DAILY FOR TREATMENT OF PAIN       Start Date: 6/15/2022 End Date: --    LIOTHYRONINE (CYTOMEL) 5 MCG TAB    Take 5 mcg by mouth once daily.       Start Date: 6/21/2022 End Date: --    SYNTHROID 100 MCG TABLET    Take 100 mcg by mouth once daily. for 90 days       Start Date: 5/13/2022 End Date: --    TRAMADOL (ULTRAM) 50 MG TABLET    Take 580 mg by mouth as needed.       Start Date: --        End Date: --   Changed and/or Refilled Medications    Modified Medication Previous Medication    LEFLUNOMIDE (ARAVA) 20 MG TAB leflunomide (ARAVA) 20 MG Tab       Take 1 tablet (20 mg total) by mouth daily with dinner or evening meal.    Take 1 tablet (20 mg total) by mouth daily with dinner or evening meal.       Start Date: 3/6/2023  End Date: 3/5/2024    Start Date: 10/26/2022End Date: 3/6/2023    OMEPRAZOLE (PRILOSEC) 40 MG CAPSULE omeprazole (PRILOSEC) 40 MG capsule       Take 1 capsule (40 mg total) by mouth every morning.    Take 1 capsule (40 mg total) by mouth every morning.       Start Date: 3/6/2023  End Date: 3/5/2024    Start Date: 10/18/2022End Date: 3/6/2023    TIZANIDINE (ZANAFLEX) 4 MG TABLET tiZANidine (ZANAFLEX) 4 MG tablet       Take 2 tablets (8 mg total) by mouth nightly.    Take 1 tablet (4 mg total) by mouth nightly.       Start Date: 3/6/2023  End Date: 9/2/2023    Start Date: 10/18/2022End Date: 3/6/2023    ZOLPIDEM (AMBIEN) 10 MG TAB zolpidem (AMBIEN) 10 mg Tab       Take  1 tablet (10 mg total) by mouth every evening.    Take 1 tablet (10 mg total) by mouth every evening.       Start Date: 3/6/2023  End Date: 9/2/2023    Start Date: 10/26/2022End Date: 3/6/2023   Discontinued Medications    MELOXICAM (MOBIC) 7.5 MG TABLET    Take 1 tablet (7.5 mg total) by mouth once daily.       Start Date: 10/18/2022End Date: 3/6/2023    UPADACITINIB (RINVOQ) 15 MG 24 HR TABLET    Take 1 tablet (15 mg total) by mouth once daily.       Start Date: 8/2/2022  End Date: 3/6/2023         ICD-10-CM ICD-9-CM   1. Seropositive rheumatoid arthritis  M05.9 714.0   2. Fibromyalgia syndrome  M79.7 729.1   3. Primary insomnia  F51.01 307.42             Plan:       1. Seropositive rheumatoid arthritis  Overview:  Resistant to methotrexate, plaquenil, leflunomide, rinvoq  HERMILO Neg, CCP Neg. Recently labs RF negative    Assessment & Plan:  Continue leflunomide 20 mg daily  Stop Rinvoq   Continue meloxicam 7.5 mg daily p.r.n.  Continue omeprazole 40 mg daily  Continue tramadol 50 mg t.i.d. p.r.n.    Start Humira 40 mg Q 14 day   TB Test negative    Rheumatoid Arthritis resistant to Methotrexate, Plaquenil, Rinvoq, leflunomide NSAIDS, Steroids      Labs ordered today for continued monitoring    Orders:  -     leflunomide (ARAVA) 20 MG Tab; Take 1 tablet (20 mg total) by mouth daily with dinner or evening meal.  Dispense: 90 tablet; Refill: 3  -     diclofenac (VOLTAREN) 75 MG EC tablet; Take 1 tablet (75 mg total) by mouth 2 (two) times daily.  Dispense: 60 tablet; Refill: 5  -     tiZANidine (ZANAFLEX) 4 MG tablet; Take 2 tablets (8 mg total) by mouth nightly.  Dispense: 180 tablet; Refill: 3  -     omeprazole (PRILOSEC) 40 MG capsule; Take 1 capsule (40 mg total) by mouth every morning.  Dispense: 90 capsule; Refill: 3  -     adalimumab (HUMIRA,CF, PEN) 40 mg/0.4 mL PnKt; Inject 0.4 mLs (40 mg total) into the skin every 14 (fourteen) days.  Dispense: 2 pen; Refill: 11  -     zolpidem (AMBIEN) 10 mg Tab; Take 1  tablet (10 mg total) by mouth every evening.  Dispense: 30 tablet; Refill: 5  -     CBC Auto Differential; Future; Expected date: 03/06/2023  -     Comprehensive Metabolic Panel; Future; Expected date: 03/06/2023  -     CRP, High Sensitivity; Future; Expected date: 03/06/2023    2. Fibromyalgia syndrome  Assessment & Plan:  Increase Tizanidine from 4 to 8 mg daily at bedtime      Orders:  -     tiZANidine (ZANAFLEX) 4 MG tablet; Take 2 tablets (8 mg total) by mouth nightly.  Dispense: 180 tablet; Refill: 3  -     omeprazole (PRILOSEC) 40 MG capsule; Take 1 capsule (40 mg total) by mouth every morning.  Dispense: 90 capsule; Refill: 3    3. Primary insomnia  Assessment & Plan:  Continue Ambien 10 mg PRN at bedtime    Orders:  -     leflunomide (ARAVA) 20 MG Tab; Take 1 tablet (20 mg total) by mouth daily with dinner or evening meal.  Dispense: 90 tablet; Refill: 3  -     tiZANidine (ZANAFLEX) 4 MG tablet; Take 2 tablets (8 mg total) by mouth nightly.  Dispense: 180 tablet; Refill: 3  -     omeprazole (PRILOSEC) 40 MG capsule; Take 1 capsule (40 mg total) by mouth every morning.  Dispense: 90 capsule; Refill: 3  -     zolpidem (AMBIEN) 10 mg Tab; Take 1 tablet (10 mg total) by mouth every evening.  Dispense: 30 tablet; Refill: 5

## 2023-03-06 ENCOUNTER — OFFICE VISIT (OUTPATIENT)
Dept: RHEUMATOLOGY | Facility: CLINIC | Age: 68
End: 2023-03-06
Payer: MEDICARE

## 2023-03-06 VITALS
SYSTOLIC BLOOD PRESSURE: 116 MMHG | HEIGHT: 60 IN | TEMPERATURE: 98 F | WEIGHT: 123 LBS | OXYGEN SATURATION: 99 % | HEART RATE: 72 BPM | RESPIRATION RATE: 16 BRPM | BODY MASS INDEX: 24.15 KG/M2 | DIASTOLIC BLOOD PRESSURE: 75 MMHG

## 2023-03-06 DIAGNOSIS — F51.01 PRIMARY INSOMNIA: ICD-10-CM

## 2023-03-06 DIAGNOSIS — M05.9 SEROPOSITIVE RHEUMATOID ARTHRITIS: ICD-10-CM

## 2023-03-06 DIAGNOSIS — M79.7 FIBROMYALGIA SYNDROME: ICD-10-CM

## 2023-03-06 PROCEDURE — 99999 PR PBB SHADOW E&M-EST. PATIENT-LVL IV: CPT | Mod: PBBFAC,,,

## 2023-03-06 PROCEDURE — 99214 OFFICE O/P EST MOD 30 MIN: CPT | Mod: PBBFAC

## 2023-03-06 PROCEDURE — 99214 PR OFFICE/OUTPT VISIT, EST, LEVL IV, 30-39 MIN: ICD-10-PCS | Mod: S$PBB,,,

## 2023-03-06 PROCEDURE — 99999 PR PBB SHADOW E&M-EST. PATIENT-LVL IV: ICD-10-PCS | Mod: PBBFAC,,,

## 2023-03-06 PROCEDURE — 99214 OFFICE O/P EST MOD 30 MIN: CPT | Mod: S$PBB,,,

## 2023-03-06 RX ORDER — ZOLPIDEM TARTRATE 10 MG/1
10 TABLET ORAL NIGHTLY
Qty: 30 TABLET | Refills: 5 | Status: SHIPPED | OUTPATIENT
Start: 2023-03-06 | End: 2023-06-19 | Stop reason: SDUPTHER

## 2023-03-06 RX ORDER — LEFLUNOMIDE 20 MG/1
20 TABLET ORAL
Qty: 90 TABLET | Refills: 3 | Status: SHIPPED | OUTPATIENT
Start: 2023-03-06 | End: 2023-06-19 | Stop reason: SDUPTHER

## 2023-03-06 RX ORDER — ADALIMUMAB 40MG/0.4ML
40 KIT SUBCUTANEOUS
Qty: 2 PEN | Refills: 11 | Status: SHIPPED | OUTPATIENT
Start: 2023-03-06 | End: 2023-05-29

## 2023-03-06 RX ORDER — TIZANIDINE 4 MG/1
8 TABLET ORAL NIGHTLY
Qty: 180 TABLET | Refills: 3 | Status: SHIPPED | OUTPATIENT
Start: 2023-03-06 | End: 2023-06-19 | Stop reason: SDUPTHER

## 2023-03-06 RX ORDER — DICLOFENAC SODIUM 75 MG/1
75 TABLET, DELAYED RELEASE ORAL 2 TIMES DAILY
Qty: 60 TABLET | Refills: 5 | Status: SHIPPED | OUTPATIENT
Start: 2023-03-06 | End: 2023-06-19

## 2023-03-06 RX ORDER — OMEPRAZOLE 40 MG/1
40 CAPSULE, DELAYED RELEASE ORAL EVERY MORNING
Qty: 90 CAPSULE | Refills: 3 | Status: SHIPPED | OUTPATIENT
Start: 2023-03-06 | End: 2023-06-19 | Stop reason: SDUPTHER

## 2023-03-23 ENCOUNTER — PATIENT MESSAGE (OUTPATIENT)
Dept: RHEUMATOLOGY | Facility: CLINIC | Age: 68
End: 2023-03-23
Payer: MEDICARE

## 2023-03-23 DIAGNOSIS — M79.7 FIBROMYALGIA SYNDROME: ICD-10-CM

## 2023-03-23 DIAGNOSIS — M05.9 SEROPOSITIVE RHEUMATOID ARTHRITIS: Primary | ICD-10-CM

## 2023-03-24 ENCOUNTER — TELEPHONE (OUTPATIENT)
Dept: RHEUMATOLOGY | Facility: CLINIC | Age: 68
End: 2023-03-24
Payer: MEDICARE

## 2023-03-24 ENCOUNTER — PATIENT MESSAGE (OUTPATIENT)
Dept: RHEUMATOLOGY | Facility: CLINIC | Age: 68
End: 2023-03-24
Payer: MEDICARE

## 2023-03-24 RX ORDER — TRAMADOL HYDROCHLORIDE 50 MG/1
50 TABLET ORAL 3 TIMES DAILY PRN
Qty: 90 TABLET | Refills: 5 | Status: SHIPPED | OUTPATIENT
Start: 2023-03-24 | End: 2023-06-19

## 2023-03-24 NOTE — TELEPHONE ENCOUNTER
----- Message from Meredith Washington sent at 3/24/2023 11:02 AM CDT -----  Regarding: Medication clarification  Patient called needing to know if she can take the Humira injection while having a viral infection. Please return call @ [phone]

## 2023-03-24 NOTE — TELEPHONE ENCOUNTER
Patient did contact us through the patient portal .   I did route the message to Dr. Rosenthal .and did notify the patient that we will get back to her with a  response. Thanks

## 2023-03-27 NOTE — TELEPHONE ENCOUNTER
The patient was wondering if she should hold her humira. She had a viral infection last week and they told her to check with you and see if she should hold it one week or not.   Thank you

## 2023-04-30 ENCOUNTER — PATIENT MESSAGE (OUTPATIENT)
Dept: RHEUMATOLOGY | Facility: CLINIC | Age: 68
End: 2023-04-30
Payer: MEDICARE

## 2023-05-02 ENCOUNTER — PATIENT MESSAGE (OUTPATIENT)
Dept: RHEUMATOLOGY | Facility: CLINIC | Age: 68
End: 2023-05-02
Payer: MEDICARE

## 2023-05-02 NOTE — TELEPHONE ENCOUNTER
Patient requesting to go back on Mobic. I had informed her that it can elevate blood pressure as well but she states that the tylenol does not help enough and she would like to be on the Mobic again if possible.  Thank you Kathie

## 2023-06-05 ENCOUNTER — APPOINTMENT (OUTPATIENT)
Dept: RADIOLOGY | Facility: HOSPITAL | Age: 68
End: 2023-06-05
Attending: PHYSICIAN ASSISTANT
Payer: MEDICARE

## 2023-06-05 DIAGNOSIS — Z85.3 PERSONAL HISTORY OF BREAST CANCER: ICD-10-CM

## 2023-06-05 LAB
CREAT SERPL-MCNC: 0.5 MG/DL (ref 0.5–1.4)
SAMPLE: NORMAL

## 2023-06-05 PROCEDURE — 77049 MRI BREAST W/WO CONTRAST, W/CAD, BILATERAL: ICD-10-PCS | Mod: 26,,, | Performed by: RADIOLOGY

## 2023-06-05 PROCEDURE — 25500020 PHARM REV CODE 255: Performed by: PHYSICIAN ASSISTANT

## 2023-06-05 PROCEDURE — 77049 MRI BREAST C-+ W/CAD BI: CPT | Mod: TC

## 2023-06-05 PROCEDURE — A9577 INJ MULTIHANCE: HCPCS | Performed by: PHYSICIAN ASSISTANT

## 2023-06-05 PROCEDURE — 77049 MRI BREAST C-+ W/CAD BI: CPT | Mod: 26,,, | Performed by: RADIOLOGY

## 2023-06-05 RX ADMIN — GADOBENATE DIMEGLUMINE 12 ML: 529 INJECTION, SOLUTION INTRAVENOUS at 11:06

## 2023-06-07 ENCOUNTER — DOCUMENTATION ONLY (OUTPATIENT)
Dept: SURGERY | Facility: CLINIC | Age: 68
End: 2023-06-07
Payer: MEDICARE

## 2023-06-07 NOTE — PROGRESS NOTES
Received letter from MARCOS Sebastian regarding wanting to start patient on Progesterone replacement.  After Dr. Diaz reviewed letter, she advised that when patient is seen at her next visit (7/5/2023) this will be discussed, and our recommendations will be forwarded to MARCOS Sebastian.  Patient was notified that we did receive letter regarding Progesterone, and this will be discussed at her next visit.  Patient verbalized understanding. Letter scanned into media manager.

## 2023-06-08 ENCOUNTER — TELEPHONE (OUTPATIENT)
Dept: HEMATOLOGY/ONCOLOGY | Facility: CLINIC | Age: 68
End: 2023-06-08
Payer: MEDICARE

## 2023-06-08 NOTE — TELEPHONE ENCOUNTER
NP with [facility] would like to know if it would be okay to prescribe progesterone replacement for this patient. Please advise.

## 2023-06-16 NOTE — ASSESSMENT & PLAN NOTE
Continue leflunomide 20 mg daily  Stop Meloxicam (d/t elevatedBP today)  Continue omeprazole 40 mg daily  Reports Tramadol does not improve pain  Start Norco 10-325mg Daily PRN for pain- Rx by MD  Continue Humira 40 mg Q 14 day     Will call PCP to do steroid shot for right knee; otherwise will call back if needs to make apt with Dr. Rosenthal for joint injection.     Continue Humira 40 mg Q 14 day     Labs completed recently with PCP- will request labs

## 2023-06-16 NOTE — PROGRESS NOTES
Subjective:           Patient ID: Hemant Eid is a 67 y.o. female.    Chief Complaint: Follow-up (Right knee pain and inflammation , Fractured finger needing 2nd opinion)      Ms. Eid is a 67-year-old female here for follow-up.  She established care with Dr. Rosenthal on 06/24/2022.  She stopped taking Rinvoq when she was prescribed the leflunomide. History of breast cancer. S/p mastectomy, radiation, and chemo. At her last visit she was initiated on Humira. And diclofenac was replaced with Meloxicam. Patient reports that diclofenac raised her blood pressure so she requested to restart Meloxicam last month. Reports right 4th finger fracture (ring finger) now in finger splint. She reports that she went back for another xray and they found a 2nd fracture in her finger. The plan is for her to wear a splint on her 4th finger then if she needs increased mobility will go to physical therapy hand specialist. She does report having an orthopedic in the past for her right knee and reports they have told her she would need a knee replacement but does not want to have surgery.  Past orthopedic; PCP- followed [facility].     Today  She is reporting joint pain involving the MCP PIP wrist elbow shoulders hips knees and ankles bilaterally. And right knee swelling The pain is 5/10 in intensity dull in quality and continuous.  That is associated with a morning stiffness lasting for more than 60 minutes.  Is also having difficulty maintaining a good night of sleep. This has been associated with myalgias.  Muscle aches are 4/10 in intensity dull in quality and continuous.  Denies fever and chills.        Review of Systems   Constitutional:  Negative for appetite change, chills and fever.   HENT:  Negative for congestion, ear pain, mouth sores, nosebleeds and trouble swallowing.    Eyes:  Negative for photophobia and discharge.   Respiratory:  Negative for chest tightness and shortness of breath.    Cardiovascular:  Negative  for chest pain.   Gastrointestinal:  Negative for abdominal pain and vomiting.   Endocrine: Negative.    Genitourinary:  Negative for hematuria.   Musculoskeletal:  Positive for joint swelling.        As per HPI  R knee swelling   Skin:  Negative for rash.   Neurological:  Negative for weakness.       Objective:   BP (!) 150/71 (BP Location: Right arm, Patient Position: Sitting, BP Method: Medium (Manual))   Pulse 76   Temp 97.5 °F (36.4 °C) (Temporal)   Resp 18   Ht 5' (1.524 m)   Wt 55.2 kg (121 lb 9.6 oz)   SpO2 99%   BMI 23.75 kg/m²          Physical Exam   Constitutional: She is oriented to person, place, and time. She appears well-developed and well-nourished. No distress.   HENT:   Head: Normocephalic and atraumatic.   Right Ear: External ear normal.   Left Ear: External ear normal.   Eyes: Pupils are equal, round, and reactive to light.   Cardiovascular: Normal rate, regular rhythm and normal heart sounds.   Pulmonary/Chest: Breath sounds normal.   Abdominal: Soft. There is no abdominal tenderness.   Musculoskeletal:      Right shoulder: Tenderness present.      Left shoulder: Tenderness present.      Right elbow: Tenderness present.      Left elbow: Tenderness present.      Right wrist: Tenderness present.      Left wrist: Tenderness present.      Cervical back: Neck supple.      Right hip: Tenderness present.      Left hip: Tenderness present.      Right knee: Swelling present. Tenderness present.      Left knee: Tenderness present.      Right ankle: Tenderness present.      Left ankle: Tenderness present.      Comments: Splint on right fourth finger   Lymphadenopathy:     She has no cervical adenopathy.   Neurological: She is alert and oriented to person, place, and time. She displays normal reflexes. No cranial nerve deficit or sensory deficit. She exhibits normal muscle tone. Coordination normal.   Skin: No rash noted. No erythema.   Vitals reviewed.      Right Side Rheumatological Exam     The  patient is tender to palpation of the shoulder, elbow, wrist, knee, 1st PIP, 1st MCP, 2nd PIP, 2nd MCP, 3rd PIP, 3rd MCP, 4th PIP, 4th MCP, hip, ankle, 1st MTP, 2nd MTP, 3rd MTP, 4th MTP, 5th MTP, 1st toe IP, 2nd toe IP, 3rd toe IP, 4th toe IP and 5th toe IP    She has swelling of the knee, 5th PIP and 5th MCP    Left Side Rheumatological Exam     The patient is tender to palpation of the shoulder, elbow, wrist, knee, 1st PIP, 1st MCP, 2nd PIP, 2nd MCP, 3rd PIP, 3rd MCP, 4th PIP, 4th MCP, 5th PIP, 5th MCP, hip, ankle, 1st MTP, 2nd MTP, 3rd MTP, 4th MTP, 5th MTP, 1st toe IP, 2nd toe IP, 3rd toe IP, 4th toe IP and 5th toe IP.         Completed Fibromyalgia exam 18/18 tender points.    No data to display     Assessment:         Medication List with Changes/Refills   Current Medications    ACETAMINOPHEN (TYLENOL) 650 MG TBSR    Take 650 mg by mouth once daily.       Start Date: --        End Date: --    ADALIMUMAB (HUMIRA,CF, PEN) 40 MG/0.4 ML PNKT    Inject 0.4 mLs (40 mg total) into the skin every 14 (fourteen) days.       Start Date: 3/6/2023  End Date: 5/29/2023    ATORVASTATIN (LIPITOR) 40 MG TABLET    Take 40 mg by mouth once daily. for 90 days       Start Date: 5/13/2022 End Date: --    LEVOTHYROXINE (SYNTHROID) 88 MCG TABLET    Take 88 mcg by mouth once daily.       Start Date: 5/26/2023 End Date: --    LIOTHYRONINE (CYTOMEL) 5 MCG TAB    Take 5 mcg by mouth once daily.       Start Date: 6/21/2022 End Date: --    TRAMADOL (ULTRAM) 50 MG TABLET    Take 580 mg by mouth as needed.       Start Date: --        End Date: --   Changed and/or Refilled Medications    Modified Medication Previous Medication    LEFLUNOMIDE (ARAVA) 20 MG TAB leflunomide (ARAVA) 20 MG Tab       Take 1 tablet (20 mg total) by mouth daily with dinner or evening meal.    Take 1 tablet (20 mg total) by mouth daily with dinner or evening meal.       Start Date: 6/19/2023 End Date: 6/18/2024    Start Date: 3/6/2023  End Date: 6/19/2023     MELOXICAM (MOBIC) 15 MG TABLET meloxicam (MOBIC) 7.5 MG tablet       Take 1 tablet (15 mg total) by mouth once daily.    Take 7.5 mg by mouth once daily.       Start Date: 6/19/2023 End Date: --    Start Date: 5/3/2023  End Date: 6/19/2023    OMEPRAZOLE (PRILOSEC) 40 MG CAPSULE omeprazole (PRILOSEC) 40 MG capsule       Take 1 capsule (40 mg total) by mouth every morning.    Take 1 capsule (40 mg total) by mouth every morning.       Start Date: 6/19/2023 End Date: 6/18/2024    Start Date: 3/6/2023  End Date: 6/19/2023    TIZANIDINE (ZANAFLEX) 4 MG TABLET tiZANidine (ZANAFLEX) 4 MG tablet       Take 2 tablets (8 mg total) by mouth nightly.    Take 2 tablets (8 mg total) by mouth nightly.       Start Date: 6/19/2023 End Date: --    Start Date: 3/6/2023  End Date: 6/19/2023    ZOLPIDEM (AMBIEN) 10 MG TAB zolpidem (AMBIEN) 10 mg Tab       Take 1 tablet (10 mg total) by mouth nightly as needed (insomnia).    Take 1 tablet (10 mg total) by mouth every evening.       Start Date: 6/19/2023 End Date: 12/16/2023    Start Date: 3/6/2023  End Date: 6/19/2023   Discontinued Medications    DICLOFENAC (VOLTAREN) 75 MG EC TABLET    Take 1 tablet (75 mg total) by mouth 2 (two) times daily.       Start Date: 3/6/2023  End Date: 6/19/2023    DICLOFENAC SODIUM (SOLARAZE) 3 % GEL    APPLY 1 GRAM 3-4 TIMES DAILY FOR TREATMENT OF PAIN       Start Date: 6/15/2022 End Date: 6/19/2023    SYNTHROID 100 MCG TABLET    Take 100 mcg by mouth once daily. for 90 days       Start Date: 5/13/2022 End Date: 6/19/2023    TRAMADOL (ULTRAM) 50 MG TABLET    Take 1 tablet (50 mg total) by mouth 3 (three) times daily as needed for Pain.       Start Date: 3/24/2023 End Date: 6/19/2023         ICD-10-CM ICD-9-CM   1. Seropositive rheumatoid arthritis  M05.9 714.0   2. Fibromyalgia syndrome  M79.7 729.1   3. Primary insomnia  F51.01 307.42   4. Synovitis of right knee  M65.9 727.09             Plan:       1. Seropositive rheumatoid  arthritis  Overview:  Resistant to methotrexate, plaquenil, leflunomide, rinvoq; allergic to sulfa  HERMILO Neg, CCP Neg. Recently labs RF negative    Quantiferon TB neg- 6/24/22    Assessment & Plan:  Continue leflunomide 20 mg daily  Stop Meloxicam (d/t elevatedBP today)  Continue omeprazole 40 mg daily  Reports Tramadol does not improve pain  Start Norco 10-325mg Daily PRN for pain- Rx by MD  Continue Humira 40 mg Q 14 day     Will call PCP to do steroid shot for right knee; otherwise will call back if needs to make apt with Dr. Rosenthal for joint injection.     Continue Humira 40 mg Q 14 day     Labs completed recently with PCP- will request labs    Orders:  -     zolpidem (AMBIEN) 10 mg Tab; Take 1 tablet (10 mg total) by mouth nightly as needed (insomnia).  Dispense: 30 tablet; Refill: 4  -     tiZANidine (ZANAFLEX) 4 MG tablet; Take 2 tablets (8 mg total) by mouth nightly.  Dispense: 180 tablet; Refill: 3  -     leflunomide (ARAVA) 20 MG Tab; Take 1 tablet (20 mg total) by mouth daily with dinner or evening meal.  Dispense: 90 tablet; Refill: 3  -     omeprazole (PRILOSEC) 40 MG capsule; Take 1 capsule (40 mg total) by mouth every morning.  Dispense: 90 capsule; Refill: 3  -     meloxicam (MOBIC) 15 MG tablet; Take 1 tablet (15 mg total) by mouth once daily.  Dispense: 90 tablet; Refill: 1    2. Fibromyalgia syndrome  Assessment & Plan:  Continue tizanidine 8 mg nightly    Orders:  -     tiZANidine (ZANAFLEX) 4 MG tablet; Take 2 tablets (8 mg total) by mouth nightly.  Dispense: 180 tablet; Refill: 3  -     omeprazole (PRILOSEC) 40 MG capsule; Take 1 capsule (40 mg total) by mouth every morning.  Dispense: 90 capsule; Refill: 3    3. Primary insomnia  Assessment & Plan:  Continue Ambien 10 mg nightly  Avoid caffeine, alcohol and stimulants.  Power down electronic devices at least one hour prior to bedtime.  Keep room dark; use eye mask or relaxation sound machine to promote rest.  Sleep hygiene refers to actions  that tend to improve and maintain good sleep:  Sleep as long as necessary to feel rested (usually seven to eight hours for adults) and then get out of bed  Maintain a regular sleep schedule, particularly a regular wake-up time in the morning  Avoid smoking or other nicotine intake, particularly during the evening        Orders:  -     zolpidem (AMBIEN) 10 mg Tab; Take 1 tablet (10 mg total) by mouth nightly as needed (insomnia).  Dispense: 30 tablet; Refill: 4    4. Synovitis of right knee  Assessment & Plan:  Stop Meloxicam (d/t elevated BP today)  Continue omeprazole 40 mg daily  Reports Tramadol does not improve pain  Start Norco 10-325mg Daily PRN for pain- Rx by MD    She reports not doing well with oral steroids; Will call PCP to do steroid shot for right knee; otherwise will call back if needs to make apt with Dr. Rosenthal for joint injection.    Consider XRAY at next appointment; In the past she was established with a orthopedic in [place]

## 2023-06-16 NOTE — ASSESSMENT & PLAN NOTE
Continue Ambien 10 mg nightly  Avoid caffeine, alcohol and stimulants.  Power down electronic devices at least one hour prior to bedtime.  Keep room dark; use eye mask or relaxation sound machine to promote rest.  Sleep hygiene refers to actions that tend to improve and maintain good sleep:  Sleep as long as necessary to feel rested (usually seven to eight hours for adults) and then get out of bed  Maintain a regular sleep schedule, particularly a regular wake-up time in the morning  Avoid smoking or other nicotine intake, particularly during the evening

## 2023-06-19 ENCOUNTER — OFFICE VISIT (OUTPATIENT)
Dept: RHEUMATOLOGY | Facility: CLINIC | Age: 68
End: 2023-06-19
Payer: MEDICARE

## 2023-06-19 VITALS
SYSTOLIC BLOOD PRESSURE: 150 MMHG | DIASTOLIC BLOOD PRESSURE: 71 MMHG | OXYGEN SATURATION: 99 % | HEIGHT: 60 IN | BODY MASS INDEX: 23.88 KG/M2 | WEIGHT: 121.63 LBS | RESPIRATION RATE: 18 BRPM | HEART RATE: 76 BPM | TEMPERATURE: 98 F

## 2023-06-19 DIAGNOSIS — F51.01 PRIMARY INSOMNIA: ICD-10-CM

## 2023-06-19 DIAGNOSIS — M65.9 SYNOVITIS OF RIGHT KNEE: ICD-10-CM

## 2023-06-19 DIAGNOSIS — M05.9 SEROPOSITIVE RHEUMATOID ARTHRITIS: Primary | ICD-10-CM

## 2023-06-19 DIAGNOSIS — M79.7 FIBROMYALGIA SYNDROME: ICD-10-CM

## 2023-06-19 PROBLEM — M65.961 SYNOVITIS OF RIGHT KNEE: Status: ACTIVE | Noted: 2023-06-19

## 2023-06-19 PROCEDURE — 99999 PR PBB SHADOW E&M-EST. PATIENT-LVL V: ICD-10-PCS | Mod: PBBFAC,,,

## 2023-06-19 PROCEDURE — 99214 OFFICE O/P EST MOD 30 MIN: CPT | Mod: S$PBB,,,

## 2023-06-19 PROCEDURE — 99215 OFFICE O/P EST HI 40 MIN: CPT | Mod: PBBFAC

## 2023-06-19 PROCEDURE — 99999 PR PBB SHADOW E&M-EST. PATIENT-LVL V: CPT | Mod: PBBFAC,,,

## 2023-06-19 PROCEDURE — 99214 PR OFFICE/OUTPT VISIT, EST, LEVL IV, 30-39 MIN: ICD-10-PCS | Mod: S$PBB,,,

## 2023-06-19 RX ORDER — LEFLUNOMIDE 20 MG/1
20 TABLET ORAL
Qty: 90 TABLET | Refills: 3 | Status: SHIPPED | OUTPATIENT
Start: 2023-06-19 | End: 2023-10-23 | Stop reason: SDUPTHER

## 2023-06-19 RX ORDER — TIZANIDINE 4 MG/1
8 TABLET ORAL NIGHTLY
Qty: 180 TABLET | Refills: 3 | Status: SHIPPED | OUTPATIENT
Start: 2023-06-19 | End: 2023-10-23 | Stop reason: SDUPTHER

## 2023-06-19 RX ORDER — LEVOTHYROXINE SODIUM 88 UG/1
88 TABLET ORAL DAILY
COMMUNITY
Start: 2023-05-26

## 2023-06-19 RX ORDER — MELOXICAM 7.5 MG/1
7.5 TABLET ORAL DAILY
COMMUNITY
Start: 2023-05-03 | End: 2023-06-19 | Stop reason: SDUPTHER

## 2023-06-19 RX ORDER — OMEPRAZOLE 40 MG/1
40 CAPSULE, DELAYED RELEASE ORAL EVERY MORNING
Qty: 90 CAPSULE | Refills: 3 | Status: SHIPPED | OUTPATIENT
Start: 2023-06-19 | End: 2023-10-23 | Stop reason: SDUPTHER

## 2023-06-19 RX ORDER — ZOLPIDEM TARTRATE 10 MG/1
10 TABLET ORAL NIGHTLY PRN
Qty: 30 TABLET | Refills: 4 | Status: SHIPPED | OUTPATIENT
Start: 2023-06-19 | End: 2023-12-16

## 2023-06-19 RX ORDER — MELOXICAM 15 MG/1
15 TABLET ORAL DAILY
Qty: 90 TABLET | Refills: 1 | Status: SHIPPED | OUTPATIENT
Start: 2023-06-19 | End: 2023-10-23

## 2023-06-19 RX ORDER — HYDROCODONE BITARTRATE AND ACETAMINOPHEN 10; 325 MG/1; MG/1
1 TABLET ORAL DAILY PRN
Qty: 30 TABLET | Refills: 0 | Status: SHIPPED | OUTPATIENT
Start: 2023-06-19 | End: 2023-07-19

## 2023-06-19 NOTE — ASSESSMENT & PLAN NOTE
Stop Meloxicam (d/t elevated BP today)  Continue omeprazole 40 mg daily  Reports Tramadol does not improve pain  Start Norco 10-325mg Daily PRN for pain- Rx by MD    She reports not doing well with oral steroids; Will call PCP to do steroid shot for right knee; otherwise will call back if needs to make apt with Dr. Rosenthal for joint injection.    Consider XRAY at next appointment; In the past she was established with a orthopedic in [place]

## 2023-06-21 ENCOUNTER — PATIENT MESSAGE (OUTPATIENT)
Dept: RHEUMATOLOGY | Facility: CLINIC | Age: 68
End: 2023-06-21
Payer: MEDICARE

## 2023-06-23 ENCOUNTER — TELEPHONE (OUTPATIENT)
Dept: RHEUMATOLOGY | Facility: CLINIC | Age: 68
End: 2023-06-23
Payer: MEDICARE

## 2023-06-23 NOTE — TELEPHONE ENCOUNTER
Faye calling to let us know that the pt is on [place] by us and ambien and a muscle relaxer by another Dr and this combination can cause respiratory depression. Is it still okay to fill medication? Please advise.

## 2023-06-30 NOTE — PROGRESS NOTES
[facility] Breast Surg  Breast Surgical Oncology  Follow Up Patient Office Visit - H&P      Referring Provider: No ref. provider found  PCP: MARCOS Arguello   Care Team:  Prior Breast Surgeon: Dr. Katherine Rivera ([phone])  Prior Medical Oncologist: Dr. Renan Fowler    Chief Complaint:   Chief Complaint   Patient presents with    Follow-up     Patient present for a 1 year high Risk follow up visit. Patient reports no issues with breast as of today.        Subjective:     Treatment Plan:  Observation: Supplemental screening with screening breast MRIs due to personal history of breast cancer and dense breast parenchyma    Interval History:  07/05/2023 - Hemant Eid is here today for annual follow up. She is doing well and has no breast concerns. She underwent diagnostic work up of chronic right breast/axilla pain present since reconstructive surgery. All findings benign. Breast MRI in June was also benign.    HPI:  Hemant Eid is a 67 y.o. female with a PMH of right breast cancer diagnosed at age 61 s/p chemotherapy, mastectomy, and radiation. Since her treatments, she has followed with an Oncologist and a Breast Surgeon in [place] for breast cancer surveillance and is looking to establish care locally since moving to [place], [place].     She was originally diagnosed at age 60 after abnormal findings were seen on her screening mammogram. Core needle biopsy 6/22/2016 of a mass in the 11:30 position confirmed breast cancer (Stage IIB invasive ductal carcinoma cT2, cN1, cM0 ER/MN negative, HER2 positive). Further evaluation with MRI revealed multifocal disease (1.1 cm mass and 2.3 cm nonmass focus) and a 1.6 cm lymph node consistent with metastatic disease. She underwent neoadjuvant chemotherapy (PTCH x's 6 cycles). She then had a right simple mastectomy and SLNB which revealed no residual invasive carcinoma (ypT0, N0). Patient reports that she had adjuvant radiation. She  was able to have CIERRA flap reconstruction of the right breast performed in [place], [place], which was complicated by an acute bleed/hematoma causing drastic drop in H/H. She is a Denominational and was unable to receive blood products but eventually recovered with erythropoietin.  She has had no breast issues since other than pain in the right breast which is still bothersome to her today.     She has had many evaluations for other unrelated problems which all found no evidence of disease (including MRI spine in , US Head/Neck 2018, US left back and arm 2018, US R UE 2018, PET/CT in 2019, CT abdomen ).    Imagin2022 BL DG MG and L breast complete US and R axilla US to evaluate tenderness in the reconstructed right axilla since her mastectomy/reconstruction - 1. No mammographic evidence of malignancy is seen involving the left breast or reconstructed right breast.  No sonographic evidence of malignancy is seen involving the left breast or either axilla.  2. No mammographic or sonographic findings to explain the patient's pain in the right axilla.  2023 Breast [place] at [facility] [place] No MR evidence of malignancy in the left breast or reconstructed right breast.    OB/GYN History:  Age at Menarche Onset: 15  Menopausal Status: postmenopausal, LMP: No LMP recorded. Patient is postmenopausal.  Hysterectomy/Oophorectomy: menopause, at age 56  Hormonal birth control (duration): none  Pregnancy History:   Age at first live birth: 29  Hormone Replacement Therapy: No, none    Other:  MG breast density: Category B  Prior thoracic RT: none  Genetic testing: none  Ashkenazi Church descent: No    Family History:  Family History   Problem Relation Age of Onset    COPD Mother     Arthritis Daughter     Heart disease Maternal Grandmother     Learning disabilities Son     Breast cancer Neg Hx         negative family history of breast cancer        Patient History:  Past Medical History:   Diagnosis  Date    Cancer Breast cancer    Dyspareunia After menopause    Menopause About 15 years ago    Menstrual symptom or sign 15 years old    Pregnancy 5    Thyroid disease Hashimoto       Body mass index is 23.32 kg/m².     Past Surgical History:   Procedure Laterality Date    BREAST SURGERY  2016     SECTION      MASTECTOMY Right     TONSILLECTOMY      TUBAL LIGATION  89       Social History     Socioeconomic History    Marital status:    Tobacco Use    Smoking status: Never     Passive exposure: Never    Smokeless tobacco: Never   Substance and Sexual Activity    Alcohol use: Yes     Comment: 1 or 2 drinks a week    Drug use: Never    Sexual activity: Not Currently     Partners: Male     Birth control/protection: Post-menopausal, None         There is no immunization history on file for this patient.    Medications/Allergies:    Current Outpatient Medications:     acetaminophen (TYLENOL) 650 MG TbSR, Take 650 mg by mouth once daily., Disp: , Rfl:     atorvastatin (LIPITOR) 40 MG tablet, Take 40 mg by mouth once daily. for 90 days, Disp: , Rfl:     HYDROcodone-acetaminophen (NORCO)  mg per tablet, Take 1 tablet by mouth daily as needed for Pain., Disp: 30 tablet, Rfl: 0    leflunomide (ARAVA) 20 MG Tab, Take 1 tablet (20 mg total) by mouth daily with dinner or evening meal., Disp: 90 tablet, Rfl: 3    levothyroxine (SYNTHROID) 88 MCG tablet, Take 88 mcg by mouth once daily., Disp: , Rfl:     liothyronine (CYTOMEL) 5 MCG Tab, Take 5 mcg by mouth once daily., Disp: , Rfl:     meloxicam (MOBIC) 15 MG tablet, Take 1 tablet (15 mg total) by mouth once daily., Disp: 90 tablet, Rfl: 1    omeprazole (PRILOSEC) 40 MG capsule, Take 1 capsule (40 mg total) by mouth every morning., Disp: 90 capsule, Rfl: 3    oxytocin, bulk, Powd, PLACE 0.2ML UNDER THE TONGUE EACH NIGHT. WAIT 30 SECONDS BEFORE SWALLOWING., Disp: , Rfl:     tiZANidine (ZANAFLEX) 4 MG tablet, Take 2 tablets (8 mg total) by mouth nightly., Disp:  180 tablet, Rfl: 3    traMADoL (ULTRAM) 50 mg tablet, Take 580 mg by mouth as needed., Disp: , Rfl:     zolpidem (AMBIEN) 10 mg Tab, Take 1 tablet (10 mg total) by mouth nightly as needed (insomnia)., Disp: 30 tablet, Rfl: 4    adalimumab (HUMIRA,CF, PEN) 40 mg/0.4 mL PnKt, Inject 0.4 mLs (40 mg total) into the skin every 14 (fourteen) days., Disp: 2 pen, Rfl: 11     Review of patient's allergies indicates:   Allergen Reactions    Sulfa (sulfonamide antibiotics) Hives, Itching and Rash     Other reaction(s): Facial Swelling       Review of Systems:  Pertinent items are noted in HPI.     Objective:     Vitals:  Vitals:    07/05/23 1313   BP: 131/77   Pulse: 73   Resp: 18   Temp: 98.5 °F (36.9 °C)         Physical Exam:  General: The patient is awake, alert and oriented times three. The patient is well nourished and in no acute distress.  Neck: There is no evidence of palpable cervical, supraclavicular or axillary adenopathy. The neck is supple. The thyroid is not enlarged.  Musculoskeletal: The patient has a normal range of motion of her bilateral upper extremities.  Chest: Examination of the chest wall fails to reveal any obvious abnormalities. Nonlabored breathing, symmetric expansion.  Breast:  Right: Examination of right reconstructed breast fails to reveal any dominant masses or areas of significant focal nodularity. There is mild tenderness to palpation of the axilla and axillary tail, which is chronic. The nipple is surgically absent. There is no skin dimpling with movement of the pectoralis. There are no significant skin changes overlying the breast.   Left: Examination of the left breast fails to reveal any dominant masses or areas of significant focal nodularity. The nipple is everted without evidence of discharge. There is no skin dimpling with movement of the pectoralis. There are no significant skin changes overlying the breast.  Abdomen: The abdomen is soft, flat, nontender and  nondistended.  Integumentary: no rashes or skin lesions present  Neurologic: cranial nerves intact, no signs of peripheral neurological deficit, motor/sensory function intact      Assessment and Plan:            Hemant was seen today for follow-up.    Diagnoses and all orders for this visit:    Screening mammogram, encounter for  -     Mammo Digital Screening Left with Donald; Future    Personal history of breast cancer  -     MRI Breast w/wo Contrast, w/CAD, Bilateral; Future    History of right mastectomy    Chronic breast pain    Breast cancer screening other than mammogram            Plan:       In the absence of significant clinical findings in the interval, a routine screening mammogram in December 2023 is recommended.       Additionally, consideration of a yearly screening breast MRI (ideally 6 months following mammography) is recommended in this patient with a personal history of breast cancer and dense breast parenchyma.    RTC in 1 year for breast cancer surveillance.        All of her questions were answered.     Katie Burgos PA-C            --------------------------------------------------------------------------------------------------------------  Total time on the date of the visit ranged from 60-74 mins (34124). Total time includes both face-to-face and non-face-to-face time personally spent by myself on the day of the visit.    Non-face-to-face time included:  _X_ preparing to see the patient such as reviewing the patient record  _X_ obtaining and reviewing separately obtained history  _X_ independently interpreting results  _X_ documenting clinical information in electronic health record.    Face-to-face time included:  _X_ performing an appropriate history and examination  _X_ communicating results to the patient  _X_ counseling and educating the patient  __ ordering appropriate medications  _x_ ordering appropriate tests  _X_ ordering appropriate procedures (including follow-up)  _X_  answering any questions the patient had    Total Time spent on date of visit: 60 minutes

## 2023-07-04 ENCOUNTER — PATIENT MESSAGE (OUTPATIENT)
Dept: SURGERY | Facility: CLINIC | Age: 68
End: 2023-07-04
Payer: MEDICARE

## 2023-07-05 ENCOUNTER — OFFICE VISIT (OUTPATIENT)
Dept: SURGERY | Facility: CLINIC | Age: 68
End: 2023-07-05
Payer: MEDICARE

## 2023-07-05 VITALS
RESPIRATION RATE: 18 BRPM | SYSTOLIC BLOOD PRESSURE: 131 MMHG | HEART RATE: 73 BPM | BODY MASS INDEX: 23.44 KG/M2 | WEIGHT: 119.38 LBS | HEIGHT: 60 IN | TEMPERATURE: 99 F | DIASTOLIC BLOOD PRESSURE: 77 MMHG | OXYGEN SATURATION: 96 %

## 2023-07-05 DIAGNOSIS — Z12.31 SCREENING MAMMOGRAM, ENCOUNTER FOR: Primary | ICD-10-CM

## 2023-07-05 DIAGNOSIS — Z85.3 PERSONAL HISTORY OF BREAST CANCER: ICD-10-CM

## 2023-07-05 DIAGNOSIS — N64.4 CHRONIC BREAST PAIN: ICD-10-CM

## 2023-07-05 DIAGNOSIS — G89.29 CHRONIC BREAST PAIN: ICD-10-CM

## 2023-07-05 DIAGNOSIS — Z12.39 BREAST CANCER SCREENING OTHER THAN MAMMOGRAM: ICD-10-CM

## 2023-07-05 DIAGNOSIS — Z90.11 HISTORY OF RIGHT MASTECTOMY: ICD-10-CM

## 2023-07-05 PROCEDURE — 99215 OFFICE O/P EST HI 40 MIN: CPT | Mod: PBBFAC | Performed by: PHYSICIAN ASSISTANT

## 2023-07-05 PROCEDURE — 99999 PR PBB SHADOW E&M-EST. PATIENT-LVL V: CPT | Mod: PBBFAC,,, | Performed by: PHYSICIAN ASSISTANT

## 2023-07-05 PROCEDURE — 99999 PR PBB SHADOW E&M-EST. PATIENT-LVL V: ICD-10-PCS | Mod: PBBFAC,,, | Performed by: PHYSICIAN ASSISTANT

## 2023-07-05 PROCEDURE — 99214 PR OFFICE/OUTPT VISIT, EST, LEVL IV, 30-39 MIN: ICD-10-PCS | Mod: S$PBB,,, | Performed by: PHYSICIAN ASSISTANT

## 2023-07-05 PROCEDURE — 99214 OFFICE O/P EST MOD 30 MIN: CPT | Mod: S$PBB,,, | Performed by: PHYSICIAN ASSISTANT

## 2023-07-05 RX ORDER — OXYTOCIN
POWDER (GRAM) MISCELLANEOUS
COMMUNITY
Start: 2023-06-27

## 2023-07-21 ENCOUNTER — LAB VISIT (OUTPATIENT)
Dept: LAB | Facility: HOSPITAL | Age: 68
End: 2023-07-21
Attending: INTERNAL MEDICINE
Payer: MEDICARE

## 2023-07-21 DIAGNOSIS — Z85.3 HX OF BREAST CANCER: ICD-10-CM

## 2023-07-21 DIAGNOSIS — Z85.3 PERSONAL HISTORY OF BREAST CANCER: ICD-10-CM

## 2023-07-21 DIAGNOSIS — Z90.11 HISTORY OF RIGHT MASTECTOMY: ICD-10-CM

## 2023-07-21 LAB
ALBUMIN SERPL-MCNC: 4.2 G/DL (ref 3.4–4.8)
ALBUMIN/GLOB SERPL: 1.4 RATIO (ref 1.1–2)
ALP SERPL-CCNC: 73 UNIT/L (ref 40–150)
ALT SERPL-CCNC: 17 UNIT/L (ref 0–55)
AST SERPL-CCNC: 19 UNIT/L (ref 5–34)
BASOPHILS # BLD AUTO: 0.06 X10(3)/MCL
BASOPHILS NFR BLD AUTO: 1.2 %
BILIRUBIN DIRECT+TOT PNL SERPL-MCNC: 1 MG/DL
BUN SERPL-MCNC: 22.6 MG/DL (ref 9.8–20.1)
CALCIUM SERPL-MCNC: 9.6 MG/DL (ref 8.4–10.2)
CEA SERPL-MCNC: <1.73 NG/ML (ref 0–3)
CHLORIDE SERPL-SCNC: 102 MMOL/L (ref 98–107)
CO2 SERPL-SCNC: 28 MMOL/L (ref 23–31)
CREAT SERPL-MCNC: 0.7 MG/DL (ref 0.55–1.02)
EOSINOPHIL # BLD AUTO: 0.54 X10(3)/MCL (ref 0–0.9)
EOSINOPHIL NFR BLD AUTO: 10.5 %
ERYTHROCYTE [DISTWIDTH] IN BLOOD BY AUTOMATED COUNT: 12.3 % (ref 11.5–17)
GFR SERPLBLD CREATININE-BSD FMLA CKD-EPI: >60 MLS/MIN/1.73/M2
GLOBULIN SER-MCNC: 3 GM/DL (ref 2.4–3.5)
GLUCOSE SERPL-MCNC: 96 MG/DL (ref 82–115)
HCT VFR BLD AUTO: 43.3 % (ref 37–47)
HGB BLD-MCNC: 13.7 G/DL (ref 12–16)
IMM GRANULOCYTES # BLD AUTO: 0.01 X10(3)/MCL (ref 0–0.04)
IMM GRANULOCYTES NFR BLD AUTO: 0.2 %
LYMPHOCYTES # BLD AUTO: 1.27 X10(3)/MCL (ref 0.6–4.6)
LYMPHOCYTES NFR BLD AUTO: 24.8 %
MCH RBC QN AUTO: 30.7 PG (ref 27–31)
MCHC RBC AUTO-ENTMCNC: 31.6 G/DL (ref 33–36)
MCV RBC AUTO: 97.1 FL (ref 80–94)
MONOCYTES # BLD AUTO: 0.71 X10(3)/MCL (ref 0.1–1.3)
MONOCYTES NFR BLD AUTO: 13.8 %
NEUTROPHILS # BLD AUTO: 2.54 X10(3)/MCL (ref 2.1–9.2)
NEUTROPHILS NFR BLD AUTO: 49.5 %
PLATELET # BLD AUTO: 233 X10(3)/MCL (ref 130–400)
PMV BLD AUTO: 10.6 FL (ref 7.4–10.4)
POTASSIUM SERPL-SCNC: 4.5 MMOL/L (ref 3.5–5.1)
PROT SERPL-MCNC: 7.2 GM/DL (ref 5.8–7.6)
RBC # BLD AUTO: 4.46 X10(6)/MCL (ref 4.2–5.4)
SODIUM SERPL-SCNC: 139 MMOL/L (ref 136–145)
WBC # SPEC AUTO: 5.13 X10(3)/MCL (ref 4.5–11.5)

## 2023-07-21 PROCEDURE — 80053 COMPREHEN METABOLIC PANEL: CPT

## 2023-07-21 PROCEDURE — 82378 CARCINOEMBRYONIC ANTIGEN: CPT

## 2023-07-21 PROCEDURE — 86300 IMMUNOASSAY TUMOR CA 15-3: CPT

## 2023-07-21 PROCEDURE — 85025 COMPLETE CBC W/AUTO DIFF WBC: CPT

## 2023-07-21 PROCEDURE — 36415 COLL VENOUS BLD VENIPUNCTURE: CPT

## 2023-07-24 LAB — CANCER AG15-3 SERPL IA-ACNC: 17 U/ML

## 2023-07-26 PROBLEM — C50.919 BREAST CANCER: Status: ACTIVE | Noted: 2023-07-26

## 2023-07-26 NOTE — PROGRESS NOTES
Subjective:       Patient ID: Hemant Eid is a 67 y.o. female.    Chief Complaint: Follow-up (No concerns today)      Diagnosis:  cT2, N1, M0 stage IIB ER/MS negative, HER2 positive multifocal invasive ductal carcinoma of the right breast diagnosed in 2016. Complete pathologic response to neoadjuvant therapy.    Current Treatment:   Observation    Treatment History:  Neoadjuvant TCHP with Neulasta for 6 cycles.  Right mastectomy  Radiation therapy  Maintenance Herceptin for 13 additional cycles to complete 1 year     HPI:  Patient who underwent routine imaging in [place] and was found to have an abnormality in the right breast, underwent a right core needle biopsy on 06/22/2016 revealed invasive ductal carcinoma, grade 2 with right axilla having lymph nodes positive for metastatic carcinoma.  ER 0%, MS 0%, HER2 3+ by IHC with a Ki-67 of 50%.  She then had a right breast MRI on 07/06/2016 that revealed right breast mass along with right axillary lymph node.  PET/CT scan on 07/08/2016 showed right lateral breast nodularity consistent with known cancer and mildly enlarged right axillary lymph node consistent with metastatic disease.  No distant metastatic disease present.  She then underwent a biopsy of a separate right breast lesion on 07/20/2016 that revealed grade 2-3 invasive ductal carcinoma, ER/MS negative, HER2 positive with a Ki-67 of 35%.  She was treated with neoadjuvant TCHP for 6 cycles, started on 07/26/2016.  She underwent a right mastectomy on 12/08/2016 that revealed complete pathologic response.  She then completed chest wall and axillary radiation.  She also had maintenance Herceptin that he finished in July of 2017. She underwent CIERRA flap reconstruction in [place], [place] on 12/07/2017.  The patient was undergoing surveillance, most recently had a mammogram in October 2021 showing no evidence of malignancy.  [facility] [place] [facility] and established care with Dr. Lima simpson  in 2022. She wanted a medical oncologist, saw me on 2022.  She had no major complaints to discuss at that visit.     **Mammogram and ultrasound done in 2022 showed no evidence of disease.  **MRI done in 2023 showed no evidence of disease.    Interval History:   Patient presents to clinic for scheduled follow up appointment.  Overall, she is doing well.  She has not had any issues since we last saw her.    Past Medical History:   Diagnosis Date    Cancer Breast cancer    Dyspareunia After menopause    Menopause About 15 years ago    Menstrual symptom or sign 15 years old    Pregnancy 5    Thyroid disease Hashimoto      Past Surgical History:   Procedure Laterality Date    BREAST SURGERY  2016     SECTION      MASTECTOMY Right     TONSILLECTOMY      TUBAL LIGATION  89     Social History     Socioeconomic History    Marital status:    Tobacco Use    Smoking status: Never     Passive exposure: Never    Smokeless tobacco: Never   Substance and Sexual Activity    Alcohol use: Yes     Comment: 1 or 2 drinks a week    Drug use: Never    Sexual activity: Not Currently     Partners: Male     Birth control/protection: Post-menopausal, None      Family History   Problem Relation Age of Onset    COPD Mother     Arthritis Daughter     Heart disease Maternal Grandmother     Learning disabilities Son     Breast cancer Neg Hx         negative family history of breast cancer      Review of patient's allergies indicates:   Allergen Reactions    Sulfa (sulfonamide antibiotics) Hives, Itching and Rash     Other reaction(s): Facial Swelling      Review of Systems   Constitutional:  Negative for appetite change and unexpected weight change.   HENT:  Negative for mouth sores.    Eyes:  Negative for visual disturbance.   Respiratory:  Negative for cough and shortness of breath.    Cardiovascular:  Negative for chest pain.   Gastrointestinal:  Negative for abdominal pain and diarrhea.    Genitourinary:  Negative for frequency.   Musculoskeletal:  Negative for back pain.   Integumentary:  Negative for rash.   Neurological:  Negative for headaches.   Hematological:  Negative for adenopathy.   Psychiatric/Behavioral:  The patient is not nervous/anxious.        Objective:      Physical Exam  Vitals reviewed. Exam conducted with a chaperone present.   Constitutional:       General: She is awake.      Appearance: Normal appearance. She is well-developed.   HENT:      Head: Normocephalic and atraumatic.   Eyes:      General: Lids are normal. Vision grossly intact.      Extraocular Movements: Extraocular movements intact.      Conjunctiva/sclera: Conjunctivae normal.   Cardiovascular:      Rate and Rhythm: Normal rate and regular rhythm.      Pulses: Normal pulses.      Heart sounds: Normal heart sounds.   Pulmonary:      Effort: Pulmonary effort is normal.      Breath sounds: Normal breath sounds.   Chest:   Breasts:     Right: Normal.      Left: Normal.   Abdominal:      General: Bowel sounds are normal. There is no distension.      Palpations: Abdomen is soft.      Tenderness: There is no abdominal tenderness.   Musculoskeletal:      Cervical back: Full passive range of motion without pain.      Right lower leg: No edema.      Left lower leg: No edema.   Lymphadenopathy:      Cervical: No cervical adenopathy.      Upper Body:      Right upper body: No supraclavicular, axillary or pectoral adenopathy.      Left upper body: No supraclavicular, axillary or pectoral adenopathy.   Skin:     General: Skin is warm and dry.   Neurological:      General: No focal deficit present.      Mental Status: She is alert and oriented to person, place, and time.   Psychiatric:         Attention and Perception: Attention and perception normal.         Behavior: Behavior is cooperative.       LABS AND IMAGING REVIEWED IN EPIC          Assessment:   cT2, N1, M0 stage IIB ER/MT negative, HER2 positive multifocal invasive  ductal carcinoma of the right breast diagnosed in 2016. Complete pathologic response to neoadjuvant therapy.      Plan:       Patient had early stage breast cancer, underwent neoadjuvant TCHP x6 cycles in 2016.  Had a complete pathologic response, mammogram was done in December of 2016.    She then completed radiation and maintenance Herceptin.    She has been on surveillance since that time.  She recently moved to [place].  She wanted to establish care here.    She is due for mammogram in October of this year, already ordered.    I will get blood work today including tumor markers.    She will return to clinic in 1 year with blood work and tumor markers.      Jesús Blanchard II, MD

## 2023-07-27 ENCOUNTER — OFFICE VISIT (OUTPATIENT)
Dept: HEMATOLOGY/ONCOLOGY | Facility: CLINIC | Age: 68
End: 2023-07-27
Payer: MEDICARE

## 2023-07-27 VITALS
HEART RATE: 67 BPM | OXYGEN SATURATION: 99 % | BODY MASS INDEX: 23.29 KG/M2 | RESPIRATION RATE: 18 BRPM | SYSTOLIC BLOOD PRESSURE: 152 MMHG | HEIGHT: 60 IN | TEMPERATURE: 97 F | WEIGHT: 118.63 LBS | DIASTOLIC BLOOD PRESSURE: 84 MMHG

## 2023-07-27 DIAGNOSIS — Z17.0 MALIGNANT NEOPLASM OF BREAST IN FEMALE, ESTROGEN RECEPTOR POSITIVE, UNSPECIFIED LATERALITY, UNSPECIFIED SITE OF BREAST: ICD-10-CM

## 2023-07-27 DIAGNOSIS — C50.919 MALIGNANT NEOPLASM OF BREAST IN FEMALE, ESTROGEN RECEPTOR POSITIVE, UNSPECIFIED LATERALITY, UNSPECIFIED SITE OF BREAST: ICD-10-CM

## 2023-07-27 PROCEDURE — 99999 PR PBB SHADOW E&M-EST. PATIENT-LVL IV: CPT | Mod: PBBFAC,,, | Performed by: INTERNAL MEDICINE

## 2023-07-27 PROCEDURE — 99214 OFFICE O/P EST MOD 30 MIN: CPT | Mod: S$PBB,,, | Performed by: INTERNAL MEDICINE

## 2023-07-27 PROCEDURE — 99214 PR OFFICE/OUTPT VISIT, EST, LEVL IV, 30-39 MIN: ICD-10-PCS | Mod: S$PBB,,, | Performed by: INTERNAL MEDICINE

## 2023-07-27 PROCEDURE — 99999 PR PBB SHADOW E&M-EST. PATIENT-LVL IV: ICD-10-PCS | Mod: PBBFAC,,, | Performed by: INTERNAL MEDICINE

## 2023-07-27 PROCEDURE — 99214 OFFICE O/P EST MOD 30 MIN: CPT | Mod: PBBFAC | Performed by: INTERNAL MEDICINE

## 2023-10-22 NOTE — PROGRESS NOTES
Subjective:           Patient ID: Hemant Eid is a 67 y.o. female.    Chief Complaint: Follow-up (Patient has complaints of bilateral knee pain, she did /Go to orthopaedic urgent care . And started physical therapy /Until she see's specialist. She has had 2 steroid injections. She /Is wanting to ask being that she is having sciatic pain if there is something/That she can take TID . )      Ms. Eid is a 67-year-old female here for follow-up.  She established care with Dr. Rosenthal on 06/24/2022.  She stopped taking Rinvoq when she was prescribed the leflunomide. History of breast cancer. S/p mastectomy, radiation, and chemo. In March 2023 she was initiated on Humira. And diclofenac was replaced with Meloxicam. Patient reports that diclofenac raised her blood pressure so she requested to restart Meloxicam last month. Reports right 4th finger fracture (ring finger) now in finger splint. She reports that she went back for another xray and they found a 2nd fracture in her finger. . She does report having an orthopedic in the past for her right knee and reports they have told her she would need a knee replacement but does not want to have surgery.  Past orthopedic; PCP- followed [facility]. Since her last visit she has been evaluated by [facility] x 2 times for right knee pain. She reports they went to remove fluid but was just blood removed. Xray of knee completed and reviewed. She is now complaining of bursitis, sciatic, and left knee pain as she hs been compensating for the right knee (affected knee) She is requesting something more for inflammation that can be taken twice per day.    Today  She is reporting joint pain involving the MCP PIP wrist elbow shoulders hips knees and ankles bilaterally. And right knee swelling The pain is 5/10 in intensity dull in quality and continuous.  That is associated with a morning stiffness lasting for about 60 minutes.  Is also having difficulty maintaining a good  night of sleep. This has been associated with myalgias.  Muscle aches are 4/10 in intensity dull in quality and continuous.  Denies fever and chills.              Review of Systems   Constitutional:  Negative for appetite change, chills and fever.   HENT:  Negative for congestion, ear pain, mouth sores, nosebleeds and trouble swallowing.    Eyes:  Negative for photophobia and discharge.   Respiratory:  Negative for chest tightness and shortness of breath.    Cardiovascular:  Negative for chest pain.   Gastrointestinal:  Negative for abdominal pain and vomiting.   Endocrine: Negative.    Genitourinary:  Negative for hematuria.   Musculoskeletal:  Positive for joint swelling.        As per HPI  R knee swelling   Skin:  Negative for rash.   Neurological:  Negative for weakness.         Objective:   /82 (BP Location: Right arm, Patient Position: Sitting, BP Method: Medium (Automatic))   Pulse 93   Temp 98.2 °F (36.8 °C) (Oral)   Resp 18   Ht 5' (1.524 m)   Wt 53.2 kg (117 lb 3.2 oz)   SpO2 99%   BMI 22.89 kg/m²          Physical Exam   Constitutional: She is oriented to person, place, and time. She appears well-developed and well-nourished. No distress.   HENT:   Head: Normocephalic and atraumatic.   Right Ear: External ear normal.   Left Ear: External ear normal.   Eyes: Pupils are equal, round, and reactive to light.   Cardiovascular: Normal rate, regular rhythm and normal heart sounds.   Pulmonary/Chest: Breath sounds normal.   Abdominal: Soft. There is no abdominal tenderness.   Musculoskeletal:         General: Tenderness present.      Right shoulder: Tenderness present.      Left shoulder: Tenderness present.      Right elbow: Tenderness present.      Left elbow: Tenderness present.      Right wrist: Tenderness present.      Left wrist: Tenderness present.      Cervical back: Neck supple.      Right hip: Tenderness present.      Left hip: Tenderness present.      Right knee: Swelling present.  Tenderness present.      Left knee: Tenderness present.      Right ankle: Tenderness present.      Left ankle: Tenderness present.      Comments: Right  knee pain/tenderness./reduced ROM   Lymphadenopathy:     She has no cervical adenopathy.   Neurological: She is alert and oriented to person, place, and time. She displays normal reflexes. No cranial nerve deficit or sensory deficit. She exhibits normal muscle tone. Coordination normal.   Skin: No rash noted. No erythema.   Psychiatric: Her mood appears anxious.   Vitals reviewed.      Right Side Rheumatological Exam     The patient is tender to palpation of the shoulder, elbow, wrist, knee, 1st PIP, 1st MCP, 2nd PIP, 2nd MCP, 3rd PIP, 3rd MCP, 4th PIP, 4th MCP, hip, ankle, 1st MTP, 2nd MTP, 3rd MTP, 4th MTP, 5th MTP, 1st toe IP, 2nd toe IP, 3rd toe IP, 4th toe IP and 5th toe IP    She has swelling of the knee, 5th PIP and 5th MCP    Left Side Rheumatological Exam     The patient is tender to palpation of the shoulder, elbow, wrist, knee, 1st PIP, 1st MCP, 2nd PIP, 2nd MCP, 3rd PIP, 3rd MCP, 4th PIP, 4th MCP, 5th PIP, 5th MCP, hip, ankle, 1st MTP, 2nd MTP, 3rd MTP, 4th MTP, 5th MTP, 1st toe IP, 2nd toe IP, 3rd toe IP, 4th toe IP and 5th toe IP.           Completed Fibromyalgia exam 18/18 tender points.    No data to display     Assessment:         Medication List with Changes/Refills   New Medications    DICLOFENAC (VOLTAREN) 75 MG EC TABLET    Take 1 tablet (75 mg total) by mouth 2 (two) times daily.       Start Date: 10/23/2023End Date: --    PREDNISONE (DELTASONE) 5 MG TABLET    Take 3 tablets (15 mg total) by mouth once daily for 5 days, THEN 2 tablets (10 mg total) once daily for 3 days, THEN 1 tablet (5 mg total) once daily for 5 days. AFTER BREAKFAST.       Start Date: 10/23/2023End Date: 11/5/2023   Current Medications    ACETAMINOPHEN (TYLENOL) 650 MG TBSR    Take 650 mg by mouth once daily.       Start Date: --        End Date: --    ACETAMINOPHEN-CODEINE  300-30MG (TYLENOL #3) 300-30 MG TAB           Start Date: 8/23/2023 End Date: --    ADALIMUMAB (HUMIRA,CF, PEN) 40 MG/0.4 ML PNKT    Inject 0.4 mLs (40 mg total) into the skin every 14 (fourteen) days.       Start Date: 3/6/2023  End Date: 5/29/2023    ATORVASTATIN (LIPITOR) 40 MG TABLET    Take 40 mg by mouth once daily. for 90 days       Start Date: 5/13/2022 End Date: --    LEVOTHYROXINE (SYNTHROID) 88 MCG TABLET    Take 88 mcg by mouth once daily.       Start Date: 5/26/2023 End Date: --    LIDOCAINE (LIDODERM) 5 %           Start Date: 9/27/2023 End Date: --    LIOTHYRONINE (CYTOMEL) 5 MCG TAB    Take 5 mcg by mouth once daily.       Start Date: 6/21/2022 End Date: --    OXYTOCIN, BULK, POWD    PLACE 0.2ML UNDER THE TONGUE EACH NIGHT. WAIT 30 SECONDS BEFORE SWALLOWING.       Start Date: 6/27/2023 End Date: --    TRAMADOL (ULTRAM) 50 MG TABLET    Take 580 mg by mouth as needed.       Start Date: --        End Date: --    ZOLPIDEM (AMBIEN) 10 MG TAB    Take 1 tablet (10 mg total) by mouth nightly as needed (insomnia).       Start Date: 6/19/2023 End Date: 12/16/2023   Changed and/or Refilled Medications    Modified Medication Previous Medication    LEFLUNOMIDE (ARAVA) 20 MG TAB leflunomide (ARAVA) 20 MG Tab       Take 1 tablet (20 mg total) by mouth daily with dinner or evening meal.    Take 1 tablet (20 mg total) by mouth daily with dinner or evening meal.       Start Date: 10/23/2023End Date: 10/22/2024    Start Date: 6/19/2023 End Date: 10/23/2023    OMEPRAZOLE (PRILOSEC) 40 MG CAPSULE omeprazole (PRILOSEC) 40 MG capsule       Take 1 capsule (40 mg total) by mouth every morning.    Take 1 capsule (40 mg total) by mouth every morning.       Start Date: 10/23/2023End Date: 10/22/2024    Start Date: 6/19/2023 End Date: 10/23/2023    TIZANIDINE (ZANAFLEX) 4 MG TABLET tiZANidine (ZANAFLEX) 4 MG tablet       Take 2 tablets (8 mg total) by mouth nightly.    Take 2 tablets (8 mg total) by mouth nightly.       Start  Date: 10/23/2023End Date: --    Start Date: 6/19/2023 End Date: 10/23/2023   Discontinued Medications    MELOXICAM (MOBIC) 15 MG TABLET    Take 1 tablet (15 mg total) by mouth once daily.       Start Date: 6/19/2023 End Date: 10/23/2023         ICD-10-CM ICD-9-CM   1. Seropositive rheumatoid arthritis  M05.9 714.0   2. Fibromyalgia syndrome  M79.7 729.1   3. Primary insomnia  F51.01 307.42   4. Synovitis of right knee  M65.9 727.09   5. Bursitis, unspecified site  M71.9 727.3   6. Osteoarthritis of right knee, unspecified osteoarthritis type  M17.11 715.96             Plan:       1. Seropositive rheumatoid arthritis  Overview:  Resistant to methotrexate, plaquenil, leflunomide, rinvoq; allergic to sulfa  HERMILO Neg, CCP Neg. Recently labs RF negative  Meloxicam discontinued d/ t elevated BP    Quantiferon TB neg- 6/24/22    Assessment & Plan:  Stable - priority is the treatment of right knee to differentiate the pain and symptoms from right knee osteoarthritis and rheumatoid arthritis.    Continue Humira 40 mg Q 14 day   Continue leflunomide 20 mg daily  Continue omeprazole 40 mg daily  Stop Meloxicam  Start Diclofenac 75 mg BID- short term with plan to decrease to 50 BID.  Reports Tramadol does not improve pain; Norco 10-325mg for 30 days prescribed at her last vist which did help her.   Norco  mg Daily PRN for pain- Rx by MD  Continue Humira 40 mg Q 14 day         Labs completed and available to view in the EMR- reviewed with the patient today during the office visit    She will not get pain medication from any other providers    Of note, by request of the patient I did review the XRAY of lumbar spine and CT of the knee. I reviewed both of these with the patient today.    Orders:  -     tiZANidine (ZANAFLEX) 4 MG tablet; Take 2 tablets (8 mg total) by mouth nightly.  Dispense: 180 tablet; Refill: 3  -     omeprazole (PRILOSEC) 40 MG capsule; Take 1 capsule (40 mg total) by mouth every morning.  Dispense: 90  capsule; Refill: 3  -     leflunomide (ARAVA) 20 MG Tab; Take 1 tablet (20 mg total) by mouth daily with dinner or evening meal.  Dispense: 90 tablet; Refill: 3    2. Fibromyalgia syndrome  Assessment & Plan:  Stable  Continue Tizanidine 8 mg nightly    Orders:  -     tiZANidine (ZANAFLEX) 4 MG tablet; Take 2 tablets (8 mg total) by mouth nightly.  Dispense: 180 tablet; Refill: 3  -     omeprazole (PRILOSEC) 40 MG capsule; Take 1 capsule (40 mg total) by mouth every morning.  Dispense: 90 capsule; Refill: 3    3. Primary insomnia  Assessment & Plan:  Stable  Continue Ambien 10 mg nightly  Avoid caffeine, alcohol and stimulants.  Power down electronic devices at least one hour prior to bedtime.  Keep room dark; use eye mask or relaxation sound machine to promote rest.  Sleep hygiene refers to actions that tend to improve and maintain good sleep:  Sleep as long as necessary to feel rested (usually seven to eight hours for adults) and then get out of bed  Maintain a regular sleep schedule, particularly a regular wake-up time in the morning  Avoid smoking or other nicotine intake, particularly during the evening          4. Synovitis of right knee  Assessment & Plan:  Stop Meloxicam (d/t elevated BP today)  Continue omeprazole 40 mg daily  Reports Tramadol does not improve pain  Start Norco 10-325mg Daily PRN for pain- Rx by MD     She reports not doing well with oral steroids; Will call PCP to do steroid shot for right knee; otherwise will call back if needs to make apt with Dr. Rosenthal for joint injection.     Consider XRAY at next appointment; In the past she was established with a orthopedic in [place]      5. Bursitis, unspecified site  -     predniSONE (DELTASONE) 5 MG tablet; Take 3 tablets (15 mg total) by mouth once daily for 5 days, THEN 2 tablets (10 mg total) once daily for 3 days, THEN 1 tablet (5 mg total) once daily for 5 days. AFTER BREAKFAST.  Dispense: 26 tablet; Refill: 0    6. Osteoarthritis of right  knee, unspecified osteoarthritis type  Assessment & Plan:  Evaluated by DAY javier in clinic x2  Awaiting appointment with Dr. Salas (knee ortho surgeon) next week to discuss replacement options  Currently in Physical Therapy  Stop meloxicam  Start Diclofenac 75 mg BID PRN- with the plan to decrease to 50 mg BID  Currently on Omeprazole  Rx low dose steroid taper for associated sciatic/bursitis symptoms for compensation from right knee    Of note, by request of the patient I did review the XRAY of lumbar spine and CT of the knee. I reviewed both of these with the patient today.      Other orders  -     diclofenac (VOLTAREN) 75 MG EC tablet; Take 1 tablet (75 mg total) by mouth 2 (two) times daily.  Dispense: 60 tablet; Refill: 5           52 minutes of total time spent on the encounter, which includes face to face time and non-face to face time preparing to see the patient (eg, review of tests), Obtaining and/or reviewing separately obtained history, Documenting clinical information in the electronic or other health record, Independently interpreting results (not separately reported) and communicating results to the patient/family/caregiver, or Care coordination (not separately reported).

## 2023-10-22 NOTE — ASSESSMENT & PLAN NOTE
Stable  Continue Ambien 10 mg nightly  Avoid caffeine, alcohol and stimulants.  Power down electronic devices at least one hour prior to bedtime.  Keep room dark; use eye mask or relaxation sound machine to promote rest.  Sleep hygiene refers to actions that tend to improve and maintain good sleep:  Sleep as long as necessary to feel rested (usually seven to eight hours for adults) and then get out of bed  Maintain a regular sleep schedule, particularly a regular wake-up time in the morning  Avoid smoking or other nicotine intake, particularly during the evening

## 2023-10-22 NOTE — ASSESSMENT & PLAN NOTE
Stable - priority is the treatment of right knee to differentiate the pain and symptoms from right knee osteoarthritis and rheumatoid arthritis.    Continue Humira 40 mg Q 14 day   Continue leflunomide 20 mg daily  Continue omeprazole 40 mg daily  Stop Meloxicam  Start Diclofenac 75 mg BID- short term with plan to decrease to 50 BID.  Reports Tramadol does not improve pain; Norco 10-325mg for 30 days prescribed at her last vist which did help her.   Norco  mg Daily PRN for pain- Rx by MD  Continue Humira 40 mg Q 14 day         Labs completed and available to view in the EMR- reviewed with the patient today during the office visit    She will not get pain medication from any other providers    Of note, by request of the patient I did review the XRAY of lumbar spine and CT of the knee. I reviewed both of these with the patient today.

## 2023-10-23 ENCOUNTER — OFFICE VISIT (OUTPATIENT)
Dept: RHEUMATOLOGY | Facility: CLINIC | Age: 68
End: 2023-10-23
Payer: MEDICARE

## 2023-10-23 VITALS
DIASTOLIC BLOOD PRESSURE: 82 MMHG | SYSTOLIC BLOOD PRESSURE: 128 MMHG | RESPIRATION RATE: 18 BRPM | BODY MASS INDEX: 23.01 KG/M2 | HEART RATE: 93 BPM | OXYGEN SATURATION: 99 % | WEIGHT: 117.19 LBS | TEMPERATURE: 98 F | HEIGHT: 60 IN

## 2023-10-23 DIAGNOSIS — M17.11 OSTEOARTHRITIS OF RIGHT KNEE, UNSPECIFIED OSTEOARTHRITIS TYPE: Primary | ICD-10-CM

## 2023-10-23 DIAGNOSIS — M05.9 SEROPOSITIVE RHEUMATOID ARTHRITIS: Primary | ICD-10-CM

## 2023-10-23 DIAGNOSIS — M71.9 BURSITIS, UNSPECIFIED SITE: ICD-10-CM

## 2023-10-23 DIAGNOSIS — M65.9 SYNOVITIS OF RIGHT KNEE: ICD-10-CM

## 2023-10-23 DIAGNOSIS — F51.01 PRIMARY INSOMNIA: ICD-10-CM

## 2023-10-23 DIAGNOSIS — M79.7 FIBROMYALGIA SYNDROME: ICD-10-CM

## 2023-10-23 DIAGNOSIS — M17.11 OSTEOARTHRITIS OF RIGHT KNEE, UNSPECIFIED OSTEOARTHRITIS TYPE: ICD-10-CM

## 2023-10-23 DIAGNOSIS — M05.9 SEROPOSITIVE RHEUMATOID ARTHRITIS: ICD-10-CM

## 2023-10-23 PROCEDURE — 99999 PR PBB SHADOW E&M-EST. PATIENT-LVL IV: ICD-10-PCS | Mod: PBBFAC,,,

## 2023-10-23 PROCEDURE — 99215 OFFICE O/P EST HI 40 MIN: CPT | Mod: S$PBB,,,

## 2023-10-23 PROCEDURE — 99999 PR PBB SHADOW E&M-EST. PATIENT-LVL IV: CPT | Mod: PBBFAC,,,

## 2023-10-23 PROCEDURE — 99214 OFFICE O/P EST MOD 30 MIN: CPT | Mod: PBBFAC

## 2023-10-23 PROCEDURE — 99215 PR OFFICE/OUTPT VISIT, EST, LEVL V, 40-54 MIN: ICD-10-PCS | Mod: S$PBB,,,

## 2023-10-23 RX ORDER — PREDNISONE 5 MG/1
TABLET ORAL
Qty: 26 TABLET | Refills: 0 | Status: SHIPPED | OUTPATIENT
Start: 2023-10-23 | End: 2023-11-05

## 2023-10-23 RX ORDER — LIDOCAINE 50 MG/G
PATCH TOPICAL
COMMUNITY
Start: 2023-09-27

## 2023-10-23 RX ORDER — TIZANIDINE 4 MG/1
8 TABLET ORAL NIGHTLY
Qty: 180 TABLET | Refills: 3 | Status: SHIPPED | OUTPATIENT
Start: 2023-10-23

## 2023-10-23 RX ORDER — DICLOFENAC SODIUM 75 MG/1
75 TABLET, DELAYED RELEASE ORAL 2 TIMES DAILY
Qty: 60 TABLET | Refills: 5 | Status: SHIPPED | OUTPATIENT
Start: 2023-10-23

## 2023-10-23 RX ORDER — LEFLUNOMIDE 20 MG/1
20 TABLET ORAL
Qty: 90 TABLET | Refills: 3 | Status: SHIPPED | OUTPATIENT
Start: 2023-10-23 | End: 2024-10-22

## 2023-10-23 RX ORDER — ACETAMINOPHEN AND CODEINE PHOSPHATE 300; 30 MG/1; MG/1
TABLET ORAL
COMMUNITY
Start: 2023-08-23

## 2023-10-23 RX ORDER — OMEPRAZOLE 40 MG/1
40 CAPSULE, DELAYED RELEASE ORAL EVERY MORNING
Qty: 90 CAPSULE | Refills: 3 | Status: SHIPPED | OUTPATIENT
Start: 2023-10-23 | End: 2024-10-22

## 2023-10-23 NOTE — ASSESSMENT & PLAN NOTE
Evaluated by DAY javier in clinic x2  Awaiting appointment with Dr. Salas (knee ortho surgeon) next week to discuss replacement options  Currently in Physical Therapy  Stop meloxicam  Start Diclofenac 75 mg BID PRN- with the plan to decrease to 50 mg BID  Currently on Omeprazole  Rx low dose steroid taper for associated sciatic/bursitis symptoms for compensation from right knee    Of note, by request of the patient I did review the XRAY of lumbar spine and CT of the knee. I reviewed both of these with the patient today.

## 2023-10-25 RX ORDER — TRAMADOL HYDROCHLORIDE 50 MG/1
50 TABLET ORAL 3 TIMES DAILY PRN
Qty: 90 EACH | Refills: 5 | Status: SHIPPED | OUTPATIENT
Start: 2023-10-25 | End: 2024-10-25

## 2023-10-25 RX ORDER — HYDROCODONE BITARTRATE AND ACETAMINOPHEN 10; 325 MG/1; MG/1
1 TABLET ORAL DAILY PRN
Qty: 30 TABLET | Refills: 0 | Status: SHIPPED | OUTPATIENT
Start: 2023-10-25 | End: 2023-11-24

## 2023-11-17 ENCOUNTER — TELEPHONE (OUTPATIENT)
Dept: RHEUMATOLOGY | Facility: CLINIC | Age: 68
End: 2023-11-17
Payer: MEDICARE

## 2023-11-17 NOTE — TELEPHONE ENCOUNTER
The nurse shenice called from [facility] wanting to advise you that   The patient has been having a elevated blood pressure and the provider there is assuming   It could be from the Diclofenac. The provider is wanting the patient to take the mobic again vs diclofenac. Or Tramadol or tylenol with codeine.  The office is wanting to advise you and find out what would the options be for this patient . I did advise that you would not be back in the office until Dec. 4th Please Advise.  Thanks .    [place]  [phone]  Nurse Darden

## 2023-11-20 NOTE — TELEPHONE ENCOUNTER
If they believe that the diclofenac is causing the elevated BP then I agree that the diclofenac should be discontinued and evaluate BP without the diclofenac. I am not able to to prescribe anything for pain to replace this medication. I do see that she  has been prescribed tramadol PRN by Dr Rosenthal last month which she has 5 months left of medication. I recommend she continue her currently prescribed medication for pain as needed.

## 2023-11-21 ENCOUNTER — TELEPHONE (OUTPATIENT)
Dept: RHEUMATOLOGY | Facility: CLINIC | Age: 68
End: 2023-11-21
Payer: MEDICARE

## 2023-11-21 NOTE — TELEPHONE ENCOUNTER
This patient is asking what other anti-inflammatory can she take since the DC of her diclofenac. She is asking if she should start back on mobic.. Please advise.

## 2023-11-22 ENCOUNTER — PATIENT MESSAGE (OUTPATIENT)
Dept: RHEUMATOLOGY | Facility: CLINIC | Age: 68
End: 2023-11-22
Payer: MEDICARE

## 2023-11-22 NOTE — TELEPHONE ENCOUNTER
Please read my response to the telephone encounter last week to the patient (it looks like this was told to a nurse with her PCP, but the patient may not know) The only reason diclofenac was discontinued was because we received a call last week from [employer] saying that the patient had elevated BP and suggested stopping the diclofenac. (Telephone encounter 11/17/23) Mobic was previously discontinued d/t elevated BP. All of the anti inflammatories can raise blood pressure. The anti inflammatory was for her right knee as she was going to be evaluated by orthopedic surgeon. I cannot prescribe anti inflammatory until BP is controlled.

## 2023-11-27 NOTE — TELEPHONE ENCOUNTER
She should get with her PCP for treatment of her bursitis.  I understand that she is in pain but unable to take antiinflammatories.. Please ask the patient to work with her PCP or even Orthopedic surgeon for the continuation of Mobic/ antiinflammatories because they are the ones who called regarding high BP when she takes the antiinflammatories. Repeating from a previous messages, when patients are not able to take anti inflammatories Dr. Rosenthal has prescribed Tramadol in the past to help with the pain. Tramadol was prescribed last month with 5 refills. So, she does have Tramadol to help with the pain although moving forward we will not be able to refill the Tramadol and other medications that she is currently prescribed from our clinic (tizanidine, tramadol, Ambien, Omeprazole, Diclofenac). I will only be able to prescribe her rheumatology medications such as Humira and leflunomide.

## 2023-11-27 NOTE — TELEPHONE ENCOUNTER
Patient states that she restarted the Mobic even though it was stopped. I informed her that you cannot given her any anti inflammatories. I informed the patient that she should stop the meloxicam because it is unsafe to take medications without medical advice especially ones that increase BP/ she states that she did not think that it caused her BP to elevate when they took her off of it in the first place. I informed the patient that it is a very common side effect and if they stopped it that was because it was doing just that and she should stop it and monitor her BP and let us know and her PCP how it is and once her blood pressure is under control then she may be able to restart something but at this time it is not safe. She verbalized understanding. She states that it is really painful for her not being on any anti inflammatories and hard to even get around at this time.

## 2023-11-27 NOTE — TELEPHONE ENCOUNTER
Attempted to call. Was unable to reach. It did not ring. Tried twice. Will try again another time.

## 2023-12-05 ENCOUNTER — TELEPHONE (OUTPATIENT)
Dept: RHEUMATOLOGY | Facility: CLINIC | Age: 68
End: 2023-12-05
Payer: MEDICARE

## 2023-12-05 NOTE — TELEPHONE ENCOUNTER
Adam PASTOR from [employer] and [employer] faxed a letter to you.   This letter has been faxed under media as a outside correspondence today .   Please Advise. Thanks

## 2023-12-12 NOTE — TELEPHONE ENCOUNTER
Letter printed from [employer]. This was addressed with the patient. I will also write a letter to the provider to update her.

## 2024-02-15 ENCOUNTER — HOSPITAL ENCOUNTER (OUTPATIENT)
Dept: RADIOLOGY | Facility: HOSPITAL | Age: 69
Discharge: HOME | End: 2024-02-15
Attending: PHYSICIAN ASSISTANT
Payer: MEDICARE

## 2024-02-15 DIAGNOSIS — Z12.31 SCREENING MAMMOGRAM, ENCOUNTER FOR: ICD-10-CM

## 2024-02-15 PROCEDURE — 77067 SCR MAMMO BI INCL CAD: CPT | Mod: TC,52

## 2024-02-15 PROCEDURE — 77067 SCR MAMMO BI INCL CAD: CPT | Mod: 26,52,, | Performed by: RADIOLOGY

## 2024-02-15 PROCEDURE — 77063 BREAST TOMOSYNTHESIS BI: CPT | Mod: 26,52,, | Performed by: RADIOLOGY

## 2024-02-23 ENCOUNTER — TELEPHONE (OUTPATIENT)
Dept: HEMATOLOGY/ONCOLOGY | Facility: CLINIC | Age: 69
End: 2024-02-23
Payer: MEDICARE

## 2024-02-23 NOTE — TELEPHONE ENCOUNTER
Patient left message c/o anorexia, weight loss and exhaustion. Requesting to be seen sooner than July. Attempted to reach patient, but not available. Left detailed message instructing her to contact PCP for workup. She is a yearly breast cancer surveillance patient.

## 2024-02-26 ENCOUNTER — TELEPHONE (OUTPATIENT)
Dept: HEMATOLOGY/ONCOLOGY | Facility: CLINIC | Age: 69
End: 2024-02-26
Payer: MEDICARE

## 2024-02-26 NOTE — TELEPHONE ENCOUNTER
Patient has decided to see a physician in [place]. She does not recall the physician's name, but does have a consultation 3/13/24.

## 2024-02-26 NOTE — TELEPHONE ENCOUNTER
Patient states she has been having anorexia with weight loss and extreme fatigue. States PCP ordered labs that were normal, according to patient. She also recently had a pelvic u/s ordered by Dr. Sammy Salas to evaluate hip pain and was found to have endometrial thickening. She is not sure what route to take or who to see at this point. She does not have a GYN. Any recommendations?

## 2024-04-02 DIAGNOSIS — R19.7 DIARRHEA, UNSPECIFIED TYPE: Primary | ICD-10-CM

## 2024-04-02 DIAGNOSIS — R63.4 WEIGHT LOSS: ICD-10-CM

## 2024-07-17 ENCOUNTER — OFFICE VISIT (OUTPATIENT)
Dept: SURGERY | Facility: CLINIC | Age: 69
End: 2024-07-17
Payer: COMMERCIAL

## 2024-07-17 VITALS
HEART RATE: 80 BPM | BODY MASS INDEX: 22.65 KG/M2 | TEMPERATURE: 99 F | DIASTOLIC BLOOD PRESSURE: 64 MMHG | SYSTOLIC BLOOD PRESSURE: 123 MMHG | WEIGHT: 115.38 LBS | RESPIRATION RATE: 18 BRPM | OXYGEN SATURATION: 99 % | HEIGHT: 60 IN

## 2024-07-17 DIAGNOSIS — Z12.31 SCREENING MAMMOGRAM, ENCOUNTER FOR: ICD-10-CM

## 2024-07-17 DIAGNOSIS — Z85.3 PERSONAL HISTORY OF BREAST CANCER: Primary | ICD-10-CM

## 2024-07-17 DIAGNOSIS — Z90.11 HISTORY OF RIGHT MASTECTOMY: ICD-10-CM

## 2024-07-17 PROCEDURE — 1160F RVW MEDS BY RX/DR IN RCRD: CPT | Mod: CPTII,S$GLB,, | Performed by: PHYSICIAN ASSISTANT

## 2024-07-17 PROCEDURE — 3078F DIAST BP <80 MM HG: CPT | Mod: CPTII,S$GLB,, | Performed by: PHYSICIAN ASSISTANT

## 2024-07-17 PROCEDURE — 3074F SYST BP LT 130 MM HG: CPT | Mod: CPTII,S$GLB,, | Performed by: PHYSICIAN ASSISTANT

## 2024-07-17 PROCEDURE — 99999 PR PBB SHADOW E&M-EST. PATIENT-LVL V: CPT | Mod: PBBFAC,,, | Performed by: PHYSICIAN ASSISTANT

## 2024-07-17 PROCEDURE — 1126F AMNT PAIN NOTED NONE PRSNT: CPT | Mod: CPTII,S$GLB,, | Performed by: PHYSICIAN ASSISTANT

## 2024-07-17 PROCEDURE — 99214 OFFICE O/P EST MOD 30 MIN: CPT | Mod: S$GLB,,, | Performed by: PHYSICIAN ASSISTANT

## 2024-07-17 PROCEDURE — 3008F BODY MASS INDEX DOCD: CPT | Mod: CPTII,S$GLB,, | Performed by: PHYSICIAN ASSISTANT

## 2024-07-17 PROCEDURE — 1159F MED LIST DOCD IN RCRD: CPT | Mod: CPTII,S$GLB,, | Performed by: PHYSICIAN ASSISTANT

## 2024-07-17 RX ORDER — AMITRIPTYLINE HYDROCHLORIDE 25 MG/1
25 TABLET, FILM COATED ORAL NIGHTLY
COMMUNITY
Start: 2024-07-15

## 2024-07-17 NOTE — PROGRESS NOTES
[facility] Breast Surg  Breast Surgical Oncology  Follow Up Patient Office Visit - H&P      Referring Provider: No ref. provider found  PCP: Larissa Carpenter FNP   Care Team:  Prior Breast Surgeon: Dr. Katherine Rivera ([phone])  Prior Medical Oncologist: Dr. [facility]rr[facility]  GYN: Dr. Doyle Willis  Current Oncologist: Dr. Maikel Blanchard  GI: Dr. Babar Chu    Chief Complaint:   Chief Complaint   Patient presents with    Follow-up     Patient reports no breast related concerns        Subjective:     Treatment Plan:  Observation: Supplemental screening with screening breast MRIs due to personal history of breast cancer and dense breast parenchyma    Interval History:  07/17/2024 - Hemant Eid is here today for annual follow up. Breast wise she is doing well and has no breast concerns. However, in the last 6 or so months, she has been having issues with loss of appetite, unexpected weight loss, fatigue, and bowel changes such as frequent diarrhea. She contacted her oncologist regarding this regarding this because she was concerned about a cancer recurrence and was advised to follow up with her PCP. Her PCP ordered imaging for further evaluation. She had a CT chest and abdomen in April which was negative for signs of malignancy but did show abnormal uterine thickening. In the interval, she underwent hysteroscopy with D&C on 4/4/2024. The findings were consistent with an endometrial polyp and a submucosal fibroid. She also presented to Dr. Chu in [place] for colonoscopy which was benign per patient. She has been given a medication for the diarrhea which has helped. She sees oncology soon for tumor marker labs.  Breast screening exams are UTD. Breast MRI will be due soon.     HPI:  Hemant Eid is a 68 y.o. female with a PMH of right breast cancer diagnosed at age 61 s/p chemotherapy, mastectomy, and radiation. Since her treatments, she has followed with an Oncologist and  a Breast Surgeon in [place] for breast cancer surveillance and is looking to establish care locally since moving to [place], [place].     She was originally diagnosed at age 60 after abnormal findings were seen on her screening mammogram. Core needle biopsy 2016 of a mass in the 11:30 position confirmed breast cancer (Stage IIB invasive ductal carcinoma cT2, cN1, cM0 ER/MN negative, HER2 positive). Further evaluation with MRI revealed multifocal disease (1.1 cm mass and 2.3 cm nonmass focus) and a 1.6 cm lymph node consistent with metastatic disease. She underwent neoadjuvant chemotherapy (PTCH x's 6 cycles). She then had a right simple mastectomy and SLNB which revealed no residual invasive carcinoma (ypT0, N0). Patient reports that she had adjuvant radiation. She was able to have CIERRA flap reconstruction of the right breast performed in [place], [place], which was complicated by an acute bleed/hematoma causing drastic drop in H/H. She is a Orthodoxy and was unable to receive blood products but eventually recovered with erythropoietin.  She has had no breast issues since other than pain in the right breast which is still bothersome to her today.     She has had many evaluations for other unrelated problems which all found no evidence of disease (including MRI spine in , US Head/Neck 2018, US left back and arm 2018, US R UE 2018, PET/CT in 2019, CT abdomen ).    Imagin2022 BL DG MG and L breast complete US and R axilla US to evaluate tenderness in the reconstructed right axilla since her mastectomy/reconstruction - 1. No mammographic evidence of malignancy is seen involving the left breast or reconstructed right breast.  No sonographic evidence of malignancy is seen involving the left breast or either axilla.  2. No mammographic or sonographic findings to explain the patient's pain in the right axilla.  [place] [facility] [place] evidence of malignancy in the left  breast or reconstructed right breast.    OB/GYN History:  Age at Menarche Onset: 15  Menopausal Status: postmenopausal, LMP: No LMP recorded. Patient is postmenopausal.  Hysterectomy/Oophorectomy: menopause, at age 56  Hormonal birth control (duration): none  Pregnancy History:   Age at first live birth: 29  Hormone Replacement Therapy: No, none    Other:  MG breast density: Category B  Prior thoracic RT: none  Genetic testing: none  Ashkenazi Muslim descent: No    Family History:  Family History   Problem Relation Name Age of Onset    COPD Mother Amy     Heart disease Maternal Grandmother ?     Arthritis Daughter Colleen     Learning disabilities Son Ezequiel     Pancreatic cancer Maternal Cousin  70 - 74    Breast cancer Neg Hx          negative family history of breast cancer        Patient History:  Past Medical History:   Diagnosis Date    Cancer Breast cancer    Dyspareunia After menopause    Fibromyalgia     Hashimoto's disease     Menopause About 15 years ago    Menstrual symptom or sign 15 years old    Pregnancy 5    Rheumatoid arthritis, unspecified     Thyroid disease Hashimoto    Unspecified osteoarthritis, unspecified site        Body mass index is 22.54 kg/m².     Past Surgical History:   Procedure Laterality Date    BREAST SURGERY  2016     SECTION      MASTECTOMY Right     TONSILLECTOMY      TOTAL KNEE ARTHROPLASTY Right 2023    TUBAL LIGATION  97       Social History     Socioeconomic History    Marital status:    Tobacco Use    Smoking status: Never     Passive exposure: Never    Smokeless tobacco: Never   Substance and Sexual Activity    Alcohol use: Yes     Comment: 1 or 2 drinks a week    Drug use: Never    Sexual activity: Yes     Partners: Male     Birth control/protection: Post-menopausal, None     Social Determinants of Health     Financial Resource Strain: Low Risk  (3/22/2024)    Received from [facility]    Overall Financial Resource Strain  (CARDIA)     Difficulty of Paying Living Expenses: Not hard at all   Food Insecurity: No Food Insecurity (3/22/2024)    Received from [facility]    Hunger Vital Sign     Worried About Running Out of Food in the Last Year: Never true     Ran Out of Food in the Last Year: Never true   Transportation Needs: No Transportation Needs (3/22/2024)    Received from [facility]    PRAPARE - Transportation     Lack of Transportation (Medical): No     Lack of Transportation (Non-Medical): No   Housing Stability: Unknown (3/22/2024)    Received from [facility]    Housing Stability Vital Sign     Unable to Pay for Housing in the Last Year: No     Homeless in the Last Year: No       Immunization History   Administered Date(s) Administered    COVID-19 MRNA, LN-S PF (MODERNA HALF 0.25 ML DOSE) 05/06/2022       Medications/Allergies:    Current Outpatient Medications:     acetaminophen (TYLENOL) 650 MG TbSR, Take 650 mg by mouth once daily., Disp: , Rfl:     adalimumab (HUMIRA,CF, PEN) 40 mg/0.4 mL PnKt, Inject 0.4 mLs (40 mg total) into the skin every 14 (fourteen) days., Disp: 2 pen, Rfl: 11    amitriptyline (ELAVIL) 25 MG tablet, Take 25 mg by mouth every evening., Disp: , Rfl:     atorvastatin (LIPITOR) 40 MG tablet, Take 40 mg by mouth once daily. for 90 days, Disp: , Rfl:     leflunomide (ARAVA) 20 MG Tab, Take 1 tablet (20 mg total) by mouth daily with dinner or evening meal., Disp: 90 tablet, Rfl: 3    levothyroxine (SYNTHROID) 88 MCG tablet, Take 88 mcg by mouth once daily., Disp: , Rfl:     liothyronine (CYTOMEL) 5 MCG Tab, Take 5 mcg by mouth once daily., Disp: , Rfl:     omeprazole (PRILOSEC) 40 MG capsule, Take 1 capsule (40 mg total) by mouth every morning., Disp: 90 capsule, Rfl: 3    tiZANidine (ZANAFLEX) 4 MG tablet, Take 2 tablets (8 mg total) by mouth nightly., Disp: 180 tablet, Rfl: 3    traMADoL (ULTRAM) 50 mg tablet, Take 1 tablet (50 mg  total) by mouth 3 (three) times daily as needed for Pain., Disp: 90 each, Rfl: 5    zolpidem (AMBIEN) 10 mg Tab, Take 1 tablet (10 mg total) by mouth nightly as needed (insomnia)., Disp: 30 tablet, Rfl: 4    sodium,potassium,mag sulfates (SUPREP BOWEL PREP KIT) 17.5-3.13-1.6 gram SolR, 1 each. (Patient not taking: Reported on 7/17/2024), Disp: , Rfl:      Review of patient's allergies indicates:   Allergen Reactions    Sulfa (sulfonamide antibiotics) Hives, Itching, Rash and Swelling     Other reaction(s): Facial Swelling       Review of Systems:  Pertinent items are noted in HPI.     Objective:     Vitals:  Vitals:    07/17/24 1301   BP: 123/64   Pulse: 80   Resp: 18   Temp: 98.9 °F (37.2 °C)         Physical Exam:  General: The patient is awake, alert and oriented times three. The patient is well nourished and in no acute distress.  Neck: There is no evidence of palpable cervical, supraclavicular or axillary adenopathy. The neck is supple. The thyroid is not enlarged.  Musculoskeletal: The patient has a normal range of motion of her bilateral upper extremities.  Chest: Examination of the chest wall fails to reveal any obvious abnormalities. Nonlabored breathing, symmetric expansion.  Breast:  Right: Examination of right reconstructed breast fails to reveal any dominant masses or areas of significant focal nodularity. There is mild tenderness to palpation of the axilla and axillary tail, which is chronic. The nipple is surgically absent. There is no skin dimpling with movement of the pectoralis. There are no significant skin changes overlying the breast.   Left: Examination of the left breast fails to reveal any dominant masses or areas of significant focal nodularity. The nipple is everted without evidence of discharge. There is no skin dimpling with movement of the pectoralis. There are no significant skin changes overlying the breast.  Abdomen: The abdomen is soft, flat, nontender and  nondistended.  Integumentary: no rashes or skin lesions present  Neurologic: cranial nerves intact, no signs of peripheral neurological deficit, motor/sensory function intact      Assessment and Plan:            Hemant was seen today for follow-up.    Diagnoses and all orders for this visit:    Personal history of breast cancer  -     MRI Screening Breast W/WO Contrast, W/CAD, [place]; Future    Screening mammogram, encounter for  -     Mammo Digital Screening Left with Donald; Future        07/17/2024 - Hemant Eid is here today for annual follow up. Breast wise she is doing well and has no breast concerns. However, in the last 6 or so months, she has been having issues with loss of appetite, unexpected weight loss, and bowel changes such as frequent diarrhea. She contacted her oncologist regarding this regarding this because she was concerned about a cancer recurrence. Her PCP ordered imaging for further evaluation. She had a CT chest and abdomen in April which was negative for signs of malignancy but did show abnormal uterine thickening. In the interval, she underwent hysteroscopy with D&C on 4/4/2024. The findings were consistent with an endometrial polyp and a submucosal fibroid. She also presented to Dr. Chu in [place] for colonoscopy which was benign per patient. She has been given a medication for the diarrhea which has helped. She sees oncology soon for tumor marker labs.  Breast screening exams are UTD. Breast MRI will be due soon.     Plan:       Breast MRI due 8/2024. SCR MG due 2/2025.     Continue follow up with oncology, expected 8/2024. Has tumor marker labs soon.    Continue follow up with GI for management of diarrhea and appetite changes, which are improving per patient.    RTC in 1 year for breast cancer surveillance.    Request colonoscopy report and progress notes from Dr. Babar Chu    CC: Dr. Larissa Carpenter      All of her questions were answered. She was advised to call if she develops  any questions or concerns.    Katie Burgos PA-C          --------------------------------------------------------------------------------------------------------------    Total time on the date of the visit ranged from 30-39 mins ([id number]). Total time includes both face-to-face and non-face-to-face time personally spent by myself on the day of the visit.    Non-face-to-face time included:  _X_ preparing to see the patient such as reviewing the patient record  __ obtaining and reviewing separately obtained history  _X_ independently interpreting results  _X_ documenting clinical information in electronic health record.    Face-to-face time included:  _X_ performing an appropriate history and examination  _X_ communicating results to the patient  _X_ counseling and educating the patient  __ ordering appropriate medications  __ ordering appropriate tests  _X_ ordering appropriate procedures (including follow-up)  _X_ answering any questions the patient had    Total Time spent on date of visit: 37 minutes

## 2024-07-26 ENCOUNTER — LAB VISIT (OUTPATIENT)
Dept: LAB | Facility: HOSPITAL | Age: 69
End: 2024-07-26
Attending: INTERNAL MEDICINE
Payer: MEDICARE

## 2024-07-26 DIAGNOSIS — Z17.0 MALIGNANT NEOPLASM OF BREAST IN FEMALE, ESTROGEN RECEPTOR POSITIVE, UNSPECIFIED LATERALITY, UNSPECIFIED SITE OF BREAST: ICD-10-CM

## 2024-07-26 DIAGNOSIS — C50.919 MALIGNANT NEOPLASM OF BREAST IN FEMALE, ESTROGEN RECEPTOR POSITIVE, UNSPECIFIED LATERALITY, UNSPECIFIED SITE OF BREAST: ICD-10-CM

## 2024-07-26 LAB
ALBUMIN SERPL-MCNC: 4.2 G/DL (ref 3.4–4.8)
ALBUMIN/GLOB SERPL: 1.6 RATIO (ref 1.1–2)
ALP SERPL-CCNC: 63 UNIT/L (ref 40–150)
ALT SERPL-CCNC: 13 UNIT/L (ref 0–55)
ANION GAP SERPL CALC-SCNC: 7 MEQ/L
AST SERPL-CCNC: 18 UNIT/L (ref 5–34)
BASOPHILS # BLD AUTO: 0.04 X10(3)/MCL
BASOPHILS NFR BLD AUTO: 0.9 %
BILIRUB SERPL-MCNC: 0.8 MG/DL
BUN SERPL-MCNC: 16.4 MG/DL (ref 9.8–20.1)
CALCIUM SERPL-MCNC: 9.8 MG/DL (ref 8.4–10.2)
CEA SERPL-MCNC: <1.73 NG/ML (ref 0–3)
CHLORIDE SERPL-SCNC: 102 MMOL/L (ref 98–107)
CO2 SERPL-SCNC: 28 MMOL/L (ref 23–31)
CREAT SERPL-MCNC: 0.68 MG/DL (ref 0.55–1.02)
CREAT/UREA NIT SERPL: 24
EOSINOPHIL # BLD AUTO: 0.3 X10(3)/MCL (ref 0–0.9)
EOSINOPHIL NFR BLD AUTO: 6.8 %
ERYTHROCYTE [DISTWIDTH] IN BLOOD BY AUTOMATED COUNT: 12.4 % (ref 11.5–17)
GFR SERPLBLD CREATININE-BSD FMLA CKD-EPI: >60 ML/MIN/1.73/M2
GLOBULIN SER-MCNC: 2.7 GM/DL (ref 2.4–3.5)
GLUCOSE SERPL-MCNC: 92 MG/DL (ref 82–115)
HCT VFR BLD AUTO: 41.9 % (ref 37–47)
HGB BLD-MCNC: 13.7 G/DL (ref 12–16)
IMM GRANULOCYTES # BLD AUTO: 0.01 X10(3)/MCL (ref 0–0.04)
IMM GRANULOCYTES NFR BLD AUTO: 0.2 %
LYMPHOCYTES # BLD AUTO: 1.58 X10(3)/MCL (ref 0.6–4.6)
LYMPHOCYTES NFR BLD AUTO: 35.6 %
MCH RBC QN AUTO: 31.4 PG (ref 27–31)
MCHC RBC AUTO-ENTMCNC: 32.7 G/DL (ref 33–36)
MCV RBC AUTO: 95.9 FL (ref 80–94)
MONOCYTES # BLD AUTO: 0.56 X10(3)/MCL (ref 0.1–1.3)
MONOCYTES NFR BLD AUTO: 12.6 %
NEUTROPHILS # BLD AUTO: 1.95 X10(3)/MCL (ref 2.1–9.2)
NEUTROPHILS NFR BLD AUTO: 43.9 %
PLATELET # BLD AUTO: 219 X10(3)/MCL (ref 130–400)
PMV BLD AUTO: 9.9 FL (ref 7.4–10.4)
POTASSIUM SERPL-SCNC: 3.8 MMOL/L (ref 3.5–5.1)
PROT SERPL-MCNC: 6.9 GM/DL (ref 5.8–7.6)
RBC # BLD AUTO: 4.37 X10(6)/MCL (ref 4.2–5.4)
SODIUM SERPL-SCNC: 137 MMOL/L (ref 136–145)
WBC # BLD AUTO: 4.44 X10(3)/MCL (ref 4.5–11.5)

## 2024-07-26 PROCEDURE — 36415 COLL VENOUS BLD VENIPUNCTURE: CPT

## 2024-07-26 PROCEDURE — 82378 CARCINOEMBRYONIC ANTIGEN: CPT

## 2024-07-26 PROCEDURE — 85025 COMPLETE CBC W/AUTO DIFF WBC: CPT

## 2024-07-26 PROCEDURE — 80053 COMPREHEN METABOLIC PANEL: CPT

## 2024-07-26 PROCEDURE — 86300 IMMUNOASSAY TUMOR CA 15-3: CPT

## 2024-07-27 LAB — CANCER AG15-3 SERPL IA-ACNC: 15 U/ML

## 2024-08-27 ENCOUNTER — OFFICE VISIT (OUTPATIENT)
Dept: HEMATOLOGY/ONCOLOGY | Facility: CLINIC | Age: 69
End: 2024-08-27
Payer: COMMERCIAL

## 2024-08-27 DIAGNOSIS — Z85.3 HISTORY OF BREAST CANCER: Primary | ICD-10-CM

## 2024-08-27 DIAGNOSIS — R19.7 DIARRHEA, UNSPECIFIED TYPE: ICD-10-CM

## 2024-08-27 NOTE — PROGRESS NOTES
Subjective:       Patient ID: Hemant Eid is a 68 y.o. female.    Chief Complaint: No chief complaint on file.      Diagnosis:  cT2, N1, M0 stage IIB ER/SC negative, HER2 positive multifocal invasive ductal carcinoma of the right breast diagnosed in 2016. Complete pathologic response to neoadjuvant therapy.    Current Treatment:   Observation    Treatment History:  Neoadjuvant TCHP with Neulasta for 6 cycles.  Right mastectomy  Radiation therapy  Maintenance Herceptin for 13 additional cycles to complete 1 year     HPI:  Patient who underwent routine imaging in [place] and was found to have an abnormality in the right breast, underwent a right core needle biopsy on 06/22/2016 revealed invasive ductal carcinoma, grade 2 with right axilla having lymph nodes positive for metastatic carcinoma.  ER 0%, SC 0%, HER2 3+ by IHC with a Ki-67 of 50%.  She then had a right breast MRI on 07/06/2016 that revealed right breast mass along with right axillary lymph node.  PET/CT scan on 07/08/2016 showed right lateral breast nodularity consistent with known cancer and mildly enlarged right axillary lymph node consistent with metastatic disease.  No distant metastatic disease present.  She then underwent a biopsy of a separate right breast lesion on 07/20/2016 that revealed grade 2-3 invasive ductal carcinoma, ER/SC negative, HER2 positive with a Ki-67 of 35%.  She was treated with neoadjuvant TCHP for 6 cycles, started on 07/26/2016.  She underwent a right mastectomy on 12/08/2016 that revealed complete pathologic response.  She then completed chest wall and axillary radiation.  She also had maintenance Herceptin that he finished in July of 2017. She underwent CIERRA flap reconstruction in [place], [place] on 12/07/2017.  The patient was undergoing surveillance, most recently had a mammogram in October 2021 showing no evidence of malignancy.  [place] [facility] and established care with Dr. Lima simpson in  2022. She wanted a medical oncologist, saw me on 2022.  She had no major complaints to discuss at that visit.     **Mammogram and ultrasound done in 2022 showed no evidence of disease.  **MRI done in 2023 showed no evidence of disease.    Interval History:   Patient presents via telemed for scheduled follow up appointment.  She states that following a knee surgery last year she started to have symptoms including decreased appetite, weight loss, weakness, diarrhea.  She also states that she continues with knee pain despite her surgery.  She was very concerned by the symptoms though a lot of this has improved.  She has had a workup for these issues including a CT of the chest abdomen and pelvis, a colonoscopy, an endometrial biopsy due to a thickened endometrium on the CT of the pelvis.  She was seen by Gyn/Onc for this.  She states that her appetite is back, she has started gaining some weight back and she is not as weak.  She does still continue to have diarrhea, mostly in the morning.  She states that GI did not have any recommendations for her diarrhea since her colonoscopy was negative.          Past Medical History:   Diagnosis Date    Cancer Breast cancer    Dyspareunia After menopause    Fibromyalgia     Hashimoto's disease     Menopause About 15 years ago    Menstrual symptom or sign 15 years old    Pregnancy 5    Rheumatoid arthritis, unspecified     Thyroid disease Hashimoto    Unspecified osteoarthritis, unspecified site       Past Surgical History:   Procedure Laterality Date    BREAST SURGERY  2016     SECTION      MASTECTOMY Right     TONSILLECTOMY      TOTAL KNEE ARTHROPLASTY Right 2023    TUBAL LIGATION  89     Social History     Socioeconomic History    Marital status:    Tobacco Use    Smoking status: Never     Passive exposure: Never    Smokeless tobacco: Never   Substance and Sexual Activity    Alcohol use: Yes     Comment: 1 or 2 drinks a week     Drug use: Never    Sexual activity: Yes     Partners: Male     Birth control/protection: Post-menopausal, None     Social Determinants of Health     Financial Resource Strain: Low Risk  (3/22/2024)    Received from [facility]    Overall Financial Resource Strain (CARDIA)     Difficulty of Paying Living Expenses: Not hard at all   Food Insecurity: No Food Insecurity (3/22/2024)    Received from [facility]    Hunger Vital Sign     Worried About Running Out of Food in the Last Year: Never true     Ran Out of Food in the Last Year: Never true   Transportation Needs: No Transportation Needs (3/22/2024)    Received from [facility]    PRAPARE - Transportation     Lack of Transportation (Medical): No     Lack of Transportation (Non-Medical): No   Housing Stability: Unknown (3/22/2024)    Received from [facility]    Housing Stability Vital Sign     Unable to Pay for Housing in the Last Year: No     Homeless in the Last Year: No      Family History   Problem Relation Name Age of Onset    COPD Mother Amy     Heart disease Maternal Grandmother ?     Arthritis Daughter Colleen     Learning disabilities Son Ezequiel     Pancreatic cancer Maternal Cousin  70 - 74    Breast cancer Neg Hx          negative family history of breast cancer      Review of patient's allergies indicates:   Allergen Reactions    Sulfa (sulfonamide antibiotics) Hives, Itching, Rash and Swelling     Other reaction(s): Facial Swelling      Review of Systems   Constitutional:  Negative for appetite change and unexpected weight change.   HENT:  Negative for mouth sores.    Eyes:  Negative for visual disturbance.   Respiratory:  Negative for cough and shortness of breath.    Cardiovascular:  Negative for chest pain.   Gastrointestinal:  Positive for diarrhea. Negative for abdominal pain.   Genitourinary:  Negative for frequency.   Musculoskeletal:  Negative for  back pain.   Integumentary:  Negative for rash.   Neurological:  Negative for headaches.   Hematological:  Negative for adenopathy.   Psychiatric/Behavioral:  The patient is not nervous/anxious.          Objective:      Physical Exam  Constitutional:       General: She is not in acute distress.  Pulmonary:      Effort: Pulmonary effort is normal.   Neurological:      Mental Status: She is alert.         LABS AND IMAGING REVIEWED IN EPIC          Assessment:   cT2, N1, M0 stage IIB ER/VT negative, HER2 positive multifocal invasive ductal carcinoma of the right breast diagnosed in 2016. Complete pathologic response to neoadjuvant therapy.      Plan:       Patient had early stage breast cancer, underwent neoadjuvant TCHP x6 cycles in 2016.  Had a complete pathologic response, mammogram was done in December of 2016.    She then completed radiation and maintenance Herceptin.    She has been on surveillance since that time.  She moved to [place].  She wanted to establish care here.    Left Mammo 2/9/24- BIRADS 1, negative    Recent colonoscopy negative    CT of the chest abdomen and pelvis in April negative other than a thickened endometrium.  Endometrial biopsy by Dr. Willis-gyn Oncology was negative.    Lab work at the end of July including tumor markers was unremarkable    Recommended she discuss something like Questran/colestipol for her diarrhea    She will return to clinic in 1 year with blood work and tumor markers.      DAWIT Naylor

## 2024-11-25 ENCOUNTER — TELEPHONE (OUTPATIENT)
Dept: SURGERY | Facility: CLINIC | Age: 69
End: 2024-11-25

## 2024-11-25 DIAGNOSIS — F41.8 SITUATIONAL ANXIETY: Primary | ICD-10-CM

## 2024-11-25 RX ORDER — ALPRAZOLAM 0.5 MG/1
0.5 TABLET ORAL ONCE AS NEEDED
Qty: 1 TABLET | Refills: 0 | Status: SHIPPED | OUTPATIENT
Start: 2024-11-25 | End: 2024-11-25

## 2024-11-25 NOTE — TELEPHONE ENCOUNTER
The patient called for Xanax to be sent to the pharmacy for her MRI appt on 12/17/24 @ 11am at [facility]. Please send to the pharmacy listed in the patient's chart.

## 2024-12-02 DIAGNOSIS — F41.8 SITUATIONAL ANXIETY: Primary | ICD-10-CM

## 2024-12-02 RX ORDER — ALPRAZOLAM 0.5 MG/1
0.5 TABLET ORAL ONCE AS NEEDED
Qty: 1 TABLET | Refills: 0 | Status: SHIPPED | OUTPATIENT
Start: 2024-12-02 | End: 2024-12-02

## 2024-12-29 DIAGNOSIS — F41.8 SITUATIONAL ANXIETY: ICD-10-CM

## 2024-12-30 RX ORDER — ALPRAZOLAM 0.5 MG/1
0.5 TABLET ORAL ONCE AS NEEDED
Qty: 1 TABLET | Refills: 0 | Status: SHIPPED | OUTPATIENT
Start: 2024-12-30 | End: 2024-12-30

## 2025-06-25 ENCOUNTER — HOSPITAL ENCOUNTER (OUTPATIENT)
Dept: RADIOLOGY | Facility: HOSPITAL | Age: 70
Discharge: HOME | End: 2025-06-25
Attending: PHYSICIAN ASSISTANT
Payer: COMMERCIAL

## 2025-06-25 DIAGNOSIS — Z12.31 SCREENING MAMMOGRAM, ENCOUNTER FOR: ICD-10-CM

## 2025-06-25 PROCEDURE — 77067 SCR MAMMO BI INCL CAD: CPT | Mod: 26,52,, | Performed by: RADIOLOGY

## 2025-06-25 PROCEDURE — 77063 BREAST TOMOSYNTHESIS BI: CPT | Mod: 26,52,, | Performed by: RADIOLOGY

## 2025-06-25 PROCEDURE — 77063 BREAST TOMOSYNTHESIS BI: CPT | Mod: TC,52

## 2025-06-30 ENCOUNTER — RESULTS FOLLOW-UP (OUTPATIENT)
Dept: SURGERY | Facility: CLINIC | Age: 70
End: 2025-06-30

## 2025-07-09 ENCOUNTER — OFFICE VISIT (OUTPATIENT)
Dept: SURGERY | Facility: CLINIC | Age: 70
End: 2025-07-09
Payer: COMMERCIAL

## 2025-07-09 VITALS
OXYGEN SATURATION: 98 % | WEIGHT: 129.38 LBS | DIASTOLIC BLOOD PRESSURE: 73 MMHG | HEIGHT: 60 IN | BODY MASS INDEX: 25.4 KG/M2 | SYSTOLIC BLOOD PRESSURE: 122 MMHG | HEART RATE: 74 BPM | TEMPERATURE: 98 F | RESPIRATION RATE: 18 BRPM

## 2025-07-09 DIAGNOSIS — Z12.31 SCREENING MAMMOGRAM, ENCOUNTER FOR: Primary | ICD-10-CM

## 2025-07-09 DIAGNOSIS — Z85.3 PERSONAL HISTORY OF BREAST CANCER: ICD-10-CM

## 2025-07-09 DIAGNOSIS — R22.2 SUBCUTANEOUS MASS OF SUPRACLAVICULAR AREA: ICD-10-CM

## 2025-07-09 PROCEDURE — 1159F MED LIST DOCD IN RCRD: CPT | Mod: CPTII,S$GLB,, | Performed by: PHYSICIAN ASSISTANT

## 2025-07-09 PROCEDURE — 99214 OFFICE O/P EST MOD 30 MIN: CPT | Mod: S$GLB,,, | Performed by: PHYSICIAN ASSISTANT

## 2025-07-09 PROCEDURE — 3008F BODY MASS INDEX DOCD: CPT | Mod: CPTII,S$GLB,, | Performed by: PHYSICIAN ASSISTANT

## 2025-07-09 PROCEDURE — 1160F RVW MEDS BY RX/DR IN RCRD: CPT | Mod: CPTII,S$GLB,, | Performed by: PHYSICIAN ASSISTANT

## 2025-07-09 PROCEDURE — 3288F FALL RISK ASSESSMENT DOCD: CPT | Mod: CPTII,S$GLB,, | Performed by: PHYSICIAN ASSISTANT

## 2025-07-09 PROCEDURE — 3078F DIAST BP <80 MM HG: CPT | Mod: CPTII,S$GLB,, | Performed by: PHYSICIAN ASSISTANT

## 2025-07-09 PROCEDURE — 3074F SYST BP LT 130 MM HG: CPT | Mod: CPTII,S$GLB,, | Performed by: PHYSICIAN ASSISTANT

## 2025-07-09 PROCEDURE — 1100F PTFALLS ASSESS-DOCD GE2>/YR: CPT | Mod: CPTII,S$GLB,, | Performed by: PHYSICIAN ASSISTANT

## 2025-07-09 PROCEDURE — 99999 PR PBB SHADOW E&M-EST. PATIENT-LVL V: CPT | Mod: PBBFAC,,, | Performed by: PHYSICIAN ASSISTANT

## 2025-07-09 PROCEDURE — 1126F AMNT PAIN NOTED NONE PRSNT: CPT | Mod: CPTII,S$GLB,, | Performed by: PHYSICIAN ASSISTANT

## 2025-07-09 RX ORDER — MELOXICAM 15 MG/1
15 TABLET ORAL
COMMUNITY
Start: 2025-05-14

## 2025-07-09 RX ORDER — PREGABALIN 25 MG/1
25 CAPSULE ORAL 2 TIMES DAILY
COMMUNITY
Start: 2025-07-07

## 2025-07-09 RX ORDER — METHOTREXATE 2.5 MG/1
TABLET ORAL
COMMUNITY
Start: 2025-06-25

## 2025-07-09 RX ORDER — FOLIC ACID 1 MG/1
1000 TABLET ORAL
COMMUNITY
Start: 2025-06-25

## 2025-07-09 RX ORDER — UPADACITINIB 15 MG/1
15 TABLET, EXTENDED RELEASE ORAL
COMMUNITY
Start: 2025-07-02

## 2025-07-09 RX ORDER — TRAMADOL HYDROCHLORIDE 50 MG/1
50 TABLET, FILM COATED ORAL
COMMUNITY
Start: 2025-06-06

## 2025-07-09 NOTE — PROGRESS NOTES
[facility] Breast Surg  Breast Surgical Oncology  Follow Up Patient Office Visit - H&P      Referring Provider: No ref. provider found  PCP: Larissa Carpenter FNP   Care Team:  Prior Breast Surgeon: Dr. Katherine Rivera ([phone])  Prior Medical Oncologist: Dr. [facility]  GYN: Dr. Doyle Willis  Current Oncologist: Dr. Maikel Blanchard  GI: Dr. Babar Chu    Chief Complaint:   Chief Complaint   Patient presents with    Follow-up     Patient denies any breast related issues, concerns, or breast changes         Subjective:     Treatment Plan:  Observation: Supplemental screening with screening breast MRIs due to personal history of breast cancer and dense breast parenchyma    Interval History:  07/10/2025 - Hemant Eid is here today for annual follow up. Breast wise she is doing well and has no breast concerns. She has a lump on her neck that she would like evaluated.    HPI:  Hemant Eid is a 69 y.o. female with a PMH of right breast cancer diagnosed at age 61 s/p chemotherapy, mastectomy, and radiation. Since her treatments, she has followed with an Oncologist and a Breast Surgeon in [place] for breast cancer surveillance and is looking to establish care locally since moving to [place], [place].     She was originally diagnosed at age 60 after abnormal findings were seen on her screening mammogram. Core needle biopsy 6/22/2016 of a mass in the 11:30 position confirmed breast cancer (Stage IIB invasive ductal carcinoma cT2, cN1, cM0 ER/NV negative, HER2 positive). Further evaluation with MRI revealed multifocal disease (1.1 cm mass and 2.3 cm nonmass focus) and a 1.6 cm lymph node consistent with metastatic disease. She underwent neoadjuvant chemotherapy (PTCH x's 6 cycles). She then had a right simple mastectomy and SLNB which revealed no residual invasive carcinoma (ypT0, N0). Patient reports that she had adjuvant radiation. She was able to have CIERRA flap reconstruction  of the right breast performed in [place], [place], which was complicated by an acute bleed/hematoma causing drastic drop in H/H. She is a Jew and was unable to receive blood products but eventually recovered with erythropoietin.  She has had no breast issues since other than pain in the right breast which is still bothersome to her today.     She has had many evaluations for other unrelated problems which all found no evidence of disease (including MRI spine in , US Head/Neck 2018, US left back and arm 2018, US R UE 2018, PET/CT in 2019, CT abdomen ).    Imagin2022 BL DG MG and L breast complete US and R axilla US to evaluate tenderness in the reconstructed right axilla since her mastectomy/reconstruction - 1. No mammographic evidence of malignancy is seen involving the left breast or reconstructed right breast.  No sonographic evidence of malignancy is seen involving the left breast or either axilla.  2. No mammographic or sonographic findings to explain the patient's pain in the right axilla.  [place] [facility] [place] evidence of malignancy in the left breast or reconstructed right breast.    OB/GYN History:  Age at Menarche Onset: 15  Menopausal Status: postmenopausal, LMP: No LMP recorded. Patient is postmenopausal.  Hysterectomy/Oophorectomy: menopause, at age 56  Hormonal birth control (duration): none  Pregnancy History:   Age at first live birth: 29  Hormone Replacement Therapy: No, none    Other:  MG breast density: Category B  Prior thoracic RT: none  Genetic testing: none  Ashkenazi Amish descent: No    Family History:  Family History   Problem Relation Name Age of Onset    COPD Mother Amy     Osteoarthritis Mother Amy     Heart disease Maternal Grandmother ?     Arthritis Daughter Colleen     Learning disabilities Son Ezequiel     Pancreatic cancer Maternal Cousin  70 - 74    Breast cancer Neg Hx          negative family history of breast  cancer        Patient History:  Past Medical History:   Diagnosis Date    Cancer Breast cancer    Dyspareunia After menopause    Fibromyalgia     Hashimoto's disease     Menopause About 15 years ago    Menstrual symptom or sign 15 years old    Pregnancy 5    Rheumatoid arthritis, unspecified     Thyroid disease Hashimoto    Unspecified osteoarthritis, unspecified site        Body mass index is 25.27 kg/m².     Past Surgical History:   Procedure Laterality Date    BREAST SURGERY  2016     SECTION      COLPOSCOPY      Clear    JOINT REPLACEMENT      Knee replacement    MASTECTOMY Right     TONSILLECTOMY      TOTAL KNEE ARTHROPLASTY Right 2023    TUBAL LIGATION  97       Social History     Socioeconomic History    Marital status:    Tobacco Use    Smoking status: Never     Passive exposure: Never    Smokeless tobacco: Never   Substance and Sexual Activity    Alcohol use: Yes     Comment: 1 or 2 drinks a week    Drug use: Never    Sexual activity: Not Currently     Partners: Male     Birth control/protection: Post-menopausal, None     Social Drivers of Health     Financial Resource Strain: Low Risk  (3/22/2024)    Received from [facility]    Overall Financial Resource Strain (CARDIA)     Difficulty of Paying Living Expenses: Not hard at all   Food Insecurity: No Food Insecurity (3/22/2024)    Received from [facility]    Hunger Vital Sign     Worried About Running Out of Food in the Last Year: Never true     Ran Out of Food in the Last Year: Never true   Transportation Needs: No Transportation Needs (3/22/2024)    Received from [facility]    PRAPARE - Transportation     Lack of Transportation (Medical): No     Lack of Transportation (Non-Medical): No   Housing Stability: Unknown (3/22/2024)    Received from [facility]    Housing Stability Vital Sign     Unable to Pay for Housing in the Last Year: No     Homeless in the Last Year: No       Immunization History    Administered Date(s) Administered    COVID-19 MRNA, LN-S PF (MODERNA HALF 0.25 ML DOSE) 05/06/2022       Medications/Allergies:    Current Outpatient Medications:     acetaminophen (TYLENOL) 650 MG TbSR, Take 650 mg by mouth once daily., Disp: , Rfl:     amitriptyline (ELAVIL) 25 MG tablet, Take 25 mg by mouth every evening., Disp: , Rfl:     atorvastatin (LIPITOR) 40 MG tablet, Take 40 mg by mouth once daily. for 90 days, Disp: , Rfl:     folic acid (FOLVITE) 1 MG tablet, Take 1,000 mcg by mouth., Disp: , Rfl:     levothyroxine (SYNTHROID) 88 MCG tablet, Take 88 mcg by mouth once daily., Disp: , Rfl:     liothyronine (CYTOMEL) 5 MCG Tab, Take 5 mcg by mouth once daily., Disp: , Rfl:     meloxicam (MOBIC) 15 MG tablet, Take 15 mg by mouth., Disp: , Rfl:     methotrexate 2.5 MG Tab, Take by mouth., Disp: , Rfl:     pregabalin (LYRICA) 25 MG capsule, Take 25 mg by mouth 2 (two) times daily., Disp: , Rfl:     RINVOQ 15 mg 24 hr tablet, Take 15 mg by mouth., Disp: , Rfl:     tiZANidine (ZANAFLEX) 4 MG tablet, Take 2 tablets (8 mg total) by mouth nightly., Disp: 180 tablet, Rfl: 3    traMADoL (ULTRAM) 50 mg tablet, Take 50 mg by mouth., Disp: , Rfl:     zolpidem (AMBIEN) 10 mg Tab, Take 1 tablet (10 mg total) by mouth nightly as needed (insomnia)., Disp: 30 tablet, Rfl: 4    leflunomide (ARAVA) 20 MG Tab, Take 1 tablet (20 mg total) by mouth daily with dinner or evening meal., Disp: 90 tablet, Rfl: 3    omeprazole (PRILOSEC) 40 MG capsule, Take 1 capsule (40 mg total) by mouth every morning., Disp: 90 capsule, Rfl: 3     Review of patient's allergies indicates:   Allergen Reactions    Sulfa (sulfonamide antibiotics) Hives, Itching, Rash and Swelling     Other reaction(s): Facial Swelling       Review of Systems:  Pertinent items are noted in HPI.     Objective:     Vitals:  Vitals:    07/09/25 1531   BP: 122/73   Pulse: 74   Resp: 18   Temp: 98 °F (36.7 °C)           Physical Exam:  General: The patient is awake, alert  and oriented times three. The patient is well nourished and in no acute distress.  Neck: There is no evidence of palpable cervical, supraclavicular or axillary adenopathy. The neck is supple. The thyroid is not enlarged. In the area of patient's concern, the sternal end of her tight clavicle bone is more prominent than the left. There is associated firmness in the supraclavicular region.  Musculoskeletal: The patient has a normal range of motion of her bilateral upper extremities.  Chest: Examination of the chest wall fails to reveal any obvious abnormalities. Nonlabored breathing, symmetric expansion.  Breast:  Right: Examination of right reconstructed breast fails to reveal any dominant masses or areas of significant focal nodularity. There is mild tenderness to palpation of the axilla and axillary tail, which is chronic. The nipple is surgically absent. There is no skin dimpling with movement of the pectoralis. There are no significant skin changes overlying the breast.   Left: Examination of the left breast fails to reveal any dominant masses or areas of significant focal nodularity. The nipple is everted without evidence of discharge. There is no skin dimpling with movement of the pectoralis. There are no significant skin changes overlying the breast.  Abdomen: The abdomen is soft, flat, nontender and nondistended.  Integumentary: no rashes or skin lesions present  Neurologic: cranial nerves intact, no signs of peripheral neurological deficit, motor/sensory function intact      Assessment and Plan:            Hemant was seen today for follow-up.    Diagnoses and all orders for this visit:    Screening mammogram, encounter for  -     Mammo Digital Screening Bilat w/ Donald (XPD); Future    Personal history of breast cancer  -     MRI Screening Breast W/WO Contrast, W/CAD, [place]; Future    Subcutaneous mass of supraclavicular area  -     US Soft Tissue Head Neck; Future        Plan:       Breast MRI due 1/2026.  Patient requires anxiety medication prior to MRI to help reduce movement during MRI. Advise she call 1 week prior to MRI for script.    SCR MG due 6/2026.    Continue follow up with oncology, expected 8/2025. Has tumor marker labs soon.    RTC in 1 year for breast cancer surveillance.    Neck US to evaluate palpable concern on her right supraclavicular region.    CC: Dr. Larissa Carpenter      All of her questions were answered. She was advised to call if she develops any questions or concerns.    Katie Burgos PA-C          --------------------------------------------------------------------------------------------------------------    Total time on the date of the visit ranged from 30-39 mins (59538). Total time includes both face-to-face and non-face-to-face time personally spent by myself on the day of the visit.    Non-face-to-face time included:  _X_ preparing to see the patient such as reviewing the patient record  __ obtaining and reviewing separately obtained history  _X_ independently interpreting results  _X_ documenting clinical information in electronic health record.    Face-to-face time included:  _X_ performing an appropriate history and examination  _X_ communicating results to the patient  _X_ counseling and educating the patient  __ ordering appropriate medications  __ ordering appropriate tests  _X_ ordering appropriate procedures (including follow-up)  _X_ answering any questions the patient had    Total Time spent on date of visit: 37 minutes

## 2025-08-21 ENCOUNTER — LAB VISIT (OUTPATIENT)
Dept: LAB | Facility: HOSPITAL | Age: 70
End: 2025-08-21
Attending: NURSE PRACTITIONER
Payer: COMMERCIAL

## 2025-08-21 DIAGNOSIS — R19.7 DIARRHEA, UNSPECIFIED TYPE: ICD-10-CM

## 2025-08-21 DIAGNOSIS — Z85.3 HISTORY OF BREAST CANCER: ICD-10-CM

## 2025-08-21 LAB
ALBUMIN SERPL-MCNC: 4.1 G/DL (ref 3.4–4.8)
ALBUMIN/GLOB SERPL: 1.2 RATIO (ref 1.1–2)
ALP SERPL-CCNC: 77 UNIT/L (ref 40–150)
ALT SERPL-CCNC: 16 UNIT/L (ref 0–55)
ANION GAP SERPL CALC-SCNC: 11 MEQ/L
AST SERPL-CCNC: 23 UNIT/L (ref 11–45)
BASOPHILS # BLD AUTO: 0.02 X10(3)/MCL
BASOPHILS NFR BLD AUTO: 0.4 %
BILIRUB SERPL-MCNC: 0.7 MG/DL
BUN SERPL-MCNC: 19.4 MG/DL (ref 9.8–20.1)
CALCIUM SERPL-MCNC: 9.5 MG/DL (ref 8.4–10.2)
CEA SERPL-MCNC: 2.22 NG/ML (ref 0–3)
CHLORIDE SERPL-SCNC: 102 MMOL/L (ref 98–107)
CO2 SERPL-SCNC: 27 MMOL/L (ref 23–31)
CREAT SERPL-MCNC: 0.67 MG/DL (ref 0.55–1.02)
CREAT/UREA NIT SERPL: 29
EOSINOPHIL # BLD AUTO: 0.05 X10(3)/MCL (ref 0–0.9)
EOSINOPHIL NFR BLD AUTO: 1 %
ERYTHROCYTE [DISTWIDTH] IN BLOOD BY AUTOMATED COUNT: 13.1 % (ref 11.5–17)
GFR SERPLBLD CREATININE-BSD FMLA CKD-EPI: >60 ML/MIN/1.73/M2
GLOBULIN SER-MCNC: 3.4 GM/DL (ref 2.4–3.5)
GLUCOSE SERPL-MCNC: 96 MG/DL (ref 82–115)
HCT VFR BLD AUTO: 38.3 % (ref 37–47)
HGB BLD-MCNC: 13.1 G/DL (ref 12–16)
IMM GRANULOCYTES # BLD AUTO: 0.03 X10(3)/MCL (ref 0–0.04)
IMM GRANULOCYTES NFR BLD AUTO: 0.6 %
LYMPHOCYTES # BLD AUTO: 1.19 X10(3)/MCL (ref 0.6–4.6)
LYMPHOCYTES NFR BLD AUTO: 23.9 %
MCH RBC QN AUTO: 33.5 PG (ref 27–31)
MCHC RBC AUTO-ENTMCNC: 34.2 G/DL (ref 33–36)
MCV RBC AUTO: 98 FL (ref 80–94)
MONOCYTES # BLD AUTO: 0.43 X10(3)/MCL (ref 0.1–1.3)
MONOCYTES NFR BLD AUTO: 8.7 %
NEUTROPHILS # BLD AUTO: 3.25 X10(3)/MCL (ref 2.1–9.2)
NEUTROPHILS NFR BLD AUTO: 65.4 %
PLATELET # BLD AUTO: 242 X10(3)/MCL (ref 130–400)
PMV BLD AUTO: 9.7 FL (ref 7.4–10.4)
POTASSIUM SERPL-SCNC: 4.1 MMOL/L (ref 3.5–5.1)
PROT SERPL-MCNC: 7.5 GM/DL (ref 5.8–7.6)
RBC # BLD AUTO: 3.91 X10(6)/MCL (ref 4.2–5.4)
SODIUM SERPL-SCNC: 140 MMOL/L (ref 136–145)
WBC # BLD AUTO: 4.97 X10(3)/MCL (ref 4.5–11.5)

## 2025-08-21 PROCEDURE — 82378 CARCINOEMBRYONIC ANTIGEN: CPT

## 2025-08-21 PROCEDURE — 36415 COLL VENOUS BLD VENIPUNCTURE: CPT

## 2025-08-21 PROCEDURE — 85025 COMPLETE CBC W/AUTO DIFF WBC: CPT

## 2025-08-21 PROCEDURE — 86300 IMMUNOASSAY TUMOR CA 15-3: CPT

## 2025-08-21 PROCEDURE — 80053 COMPREHEN METABOLIC PANEL: CPT

## 2025-08-22 LAB — CANCER AG15-3 SERPL IA-ACNC: 16 U/ML

## 2025-08-29 ENCOUNTER — OFFICE VISIT (OUTPATIENT)
Dept: HEMATOLOGY/ONCOLOGY | Facility: CLINIC | Age: 70
End: 2025-08-29
Payer: COMMERCIAL

## 2025-08-29 DIAGNOSIS — C50.919 MALIGNANT NEOPLASM OF FEMALE BREAST, UNSPECIFIED ESTROGEN RECEPTOR STATUS, UNSPECIFIED LATERALITY, UNSPECIFIED SITE OF BREAST: ICD-10-CM
